# Patient Record
Sex: MALE | Race: WHITE | NOT HISPANIC OR LATINO | Employment: OTHER | URBAN - METROPOLITAN AREA
[De-identification: names, ages, dates, MRNs, and addresses within clinical notes are randomized per-mention and may not be internally consistent; named-entity substitution may affect disease eponyms.]

---

## 2017-01-10 ENCOUNTER — OFFICE VISIT (OUTPATIENT)
Dept: SPINE | Facility: CLINIC | Age: 67
End: 2017-01-10
Attending: NEUROLOGICAL SURGERY
Payer: COMMERCIAL

## 2017-01-10 VITALS
WEIGHT: 210 LBS | BODY MASS INDEX: 32.96 KG/M2 | SYSTOLIC BLOOD PRESSURE: 160 MMHG | HEIGHT: 67 IN | RESPIRATION RATE: 18 BRPM | DIASTOLIC BLOOD PRESSURE: 86 MMHG

## 2017-01-10 DIAGNOSIS — M48.02 DEGENERATIVE CERVICAL SPINAL STENOSIS: Primary | ICD-10-CM

## 2017-01-10 DIAGNOSIS — R29.898 WEAKNESS OF LEFT HAND: ICD-10-CM

## 2017-01-10 PROCEDURE — 99999 PR PBB SHADOW E&M-EST. PATIENT-LVL II: CPT | Mod: PBBFAC,,, | Performed by: NEUROLOGICAL SURGERY

## 2017-01-10 PROCEDURE — 99214 OFFICE O/P EST MOD 30 MIN: CPT | Mod: S$GLB,,, | Performed by: NEUROLOGICAL SURGERY

## 2017-01-10 PROCEDURE — 99212 OFFICE O/P EST SF 10 MIN: CPT | Mod: PBBFAC | Performed by: NEUROLOGICAL SURGERY

## 2017-01-10 NOTE — LETTER
January 10, 2017      Aydin Rojo MD  [address] [facility] [address] [place] [address]           [facility] - Spine Services  [address] [address]  Phone: [phone]  Fax: [phone]          Patient: Vikas Angelo   MR Number: [id number]   YOB: 1950   Date of Visit: 1/10/2017       Dear Dr. Adyin Rojo:    Thank you for referring Vikas Angelo to me for evaluation. Attached you will find relevant portions of my assessment and plan of care.    If you have questions, please do not hesitate to call me. I look forward to following Vikas Angelo along with you.    Sincerely,    Tray Qiu MD    Enclosure  CC:  No Recipients    If you would like to receive this communication electronically, please contact [email or web address] or [phone] to request more information on [facility] access.    For providers and/or their staff who would like to refer a patient to [facility], please contact us through our one-stop-shop provider referral line, [facility], at [phone].    If you feel you have received this communication in error or would no longer like to receive these types of communications, please e-mail [email or web address]

## 2017-01-12 ENCOUNTER — TELEPHONE (OUTPATIENT)
Dept: NEUROSURGERY | Facility: CLINIC | Age: 67
End: 2017-01-12

## 2017-01-12 NOTE — TELEPHONE ENCOUNTER
Informed patient of rescheduled appointment to next Tuesday. States he will call back once he checks his calendar.

## 2017-01-17 ENCOUNTER — OFFICE VISIT (OUTPATIENT)
Dept: NEUROSURGERY | Facility: CLINIC | Age: 67
End: 2017-01-17
Payer: COMMERCIAL

## 2017-01-17 VITALS
HEIGHT: 67 IN | SYSTOLIC BLOOD PRESSURE: 147 MMHG | WEIGHT: 210.75 LBS | BODY MASS INDEX: 33.08 KG/M2 | HEART RATE: 82 BPM | DIASTOLIC BLOOD PRESSURE: 89 MMHG

## 2017-01-17 DIAGNOSIS — I10 ESSENTIAL HYPERTENSION: ICD-10-CM

## 2017-01-17 DIAGNOSIS — R29.898 WEAKNESS OF LEFT HAND: ICD-10-CM

## 2017-01-17 DIAGNOSIS — G62.9 PERIPHERAL NERVE DISEASE: ICD-10-CM

## 2017-01-17 DIAGNOSIS — E78.5 HYPERLIPIDEMIA, UNSPECIFIED HYPERLIPIDEMIA TYPE: ICD-10-CM

## 2017-01-17 DIAGNOSIS — M54.12 CERVICAL RADICULOPATHY: ICD-10-CM

## 2017-01-17 DIAGNOSIS — E34.9 TESTOSTERONE DEFICIENCY: ICD-10-CM

## 2017-01-17 DIAGNOSIS — M48.02 CERVICAL STENOSIS OF SPINAL CANAL: Primary | ICD-10-CM

## 2017-01-17 DIAGNOSIS — M48.02 DEGENERATIVE CERVICAL SPINAL STENOSIS: ICD-10-CM

## 2017-01-17 PROCEDURE — 99214 OFFICE O/P EST MOD 30 MIN: CPT | Mod: S$GLB,,, | Performed by: NEUROLOGICAL SURGERY

## 2017-01-17 RX ORDER — SILDENAFIL 50 MG/1
50 TABLET, FILM COATED ORAL
COMMUNITY
Start: 2016-12-05

## 2017-01-17 RX ORDER — PRIMIDONE 50 MG/1
50 TABLET ORAL DAILY
COMMUNITY
End: 2018-01-03

## 2017-01-17 RX ORDER — FOLIC ACID 0.8 MG
800 TABLET ORAL DAILY
COMMUNITY

## 2017-01-17 RX ORDER — HYDROCORTISONE 25 MG/G
1 CREAM TOPICAL
COMMUNITY
End: 2018-01-03

## 2017-01-17 NOTE — MR AVS SNAPSHOT
[place] - Neurosurgery  [address] [facility]  [facility] [address]  Phone: [phone]  Fax: [phone]                  Vikas Angelo   2017 9:30 AM   Office Visit    Description:  Male : 1950   Provider:  Aydin Rojo MD   Department:  [place] - Neurosurgery           Reason for Visit     Cervical Spine Pain (C-spine)           Diagnoses this Visit        Comments    Cervical stenosis of spinal canal    -  Primary            To Do List           Goals (5 Years of Data)     None      [facility]     [facility] On Call Nurse Care Line -  Assistance  Registered nurses in the [facility] provide clinical advisement, health education, appointment booking, and other advisory services.  Call for this free service at [phone].             Medications           Message regarding Medications     Verify the changes and/or additions to your medication regime listed below are the same as discussed with your clinician today.  If any of these changes or additions are incorrect, please notify your healthcare provider.             Verify that the below list of medications is an accurate representation of the medications you are currently taking.  If none reported, the list may be blank. If incorrect, please contact your healthcare provider. Carry this list with you in case of emergency.           Current Medications     acetaminophen (TYLENOL) 500 MG tablet Take 500 mg by mouth every 6 (six) hours as needed for Pain.    allopurinol (ZYLOPRIM) 300 MG tablet Take 300 mg by mouth once daily.    amlodipine (NORVASC) 5 MG tablet Take 5 mg by mouth 2 (two) times daily.    aspirin (ECOTRIN) 81 MG EC tablet Take 81 mg by mouth once daily.    cyanocobalamin (VITAMIN B-12) 1000 MCG tablet Take 100 mcg by mouth once daily.    donepezil (ARICEPT) 10 MG tablet Take 10 mg by mouth 2 (two) times daily.    ferrous sulfate 324 mg (65 mg iron) TbEC Take 325 mg by mouth once daily.     "fluticasone (FLONASE) 50 mcg/actuation nasal spray 1 spray by Each Nare route once daily.    folic acid (FOLVITE) 800 MCG Tab Take 800 mcg by mouth once daily.    gabapentin (NEURONTIN) 300 MG capsule Take 300 mg by mouth 4 (four) times daily.    ibuprofen (ADVIL,MOTRIN) 800 MG tablet TAKE 1 TABLET (800 MG TOTAL) BY MOUTH 2 (TWO) TIMES DAILY AS NEEDED FOR PAIN.    meloxicam (MOBIC) 15 MG tablet Take 15 mg by mouth once daily.    memantine (NAMENDA) 10 MG Tab Take 5 mg by mouth 2 (two) times daily.    MULTIVIT-IRON-MIN-FOLIC ACID 3,500-18-0.4 UNIT-MG-MG ORAL CHEW Take by mouth.    niacin, inositol niacinate, 500 mg Tab Take 1 tablet by mouth.    primidone (MYSOLINE) 50 MG Tab Take 50 mg by mouth once daily.    sildenafil (VIAGRA) 50 MG tablet Take 50 mg by mouth as needed.    vitamin E 400 UNIT capsule Take 400 Units by mouth once daily.    hydrocortisone 2.5 % cream Place 1 application rectally as needed.           Clinical Reference Information           Vital Signs - Last Recorded  Most recent update: 1/17/2017  9:09 AM by Marita Vogel LPN    BP Pulse Ht Wt BMI    (!) 147/89 (BP Location: Left arm, Patient Position: Sitting, BP Method: Automatic) 82 5' 7" (1.702 m) 95.6 kg (210 lb 12.2 oz) 33.01 kg/m2      Blood Pressure          Most Recent Value    BP  (!)  147/89      Allergies as of 1/17/2017     No Known Allergies      Immunizations Administered on Date of Encounter - 1/17/2017     None      Orders Placed During Today's Visit     Future Labs/Procedures Expected by Expires    CT Cervical Spine Without Contrast  1/17/2017 1/17/2018      [facility] Sign-Up     Activating your [facility] account is as easy as 1-2-3!     1) Visit [email or web address], select Sign Up Now, enter this activation code and your date of birth, then select Next.  [email or web address]  Expires: 3/2/2017  8:44 AM      2) Create a username and password to use when you visit [facility] in the future and select a security question in case you lose " your password and select Next.    3) Enter your e-mail address and click Sign Up!    Additional Information  If you have questions, please e-mail [email or web address] or call [phone] to talk to our [facility] staff. Remember, [facility] is NOT to be used for urgent needs. For medical emergencies, dial 911.

## 2017-01-17 NOTE — PROGRESS NOTES
Neurosurgery History and Physical    Patient ID: Vikas Angelo is a 66 y.o. male.    Chief Complaint   Patient presents with    Cervical Spine Pain (C-spine)     Pt's initial OV here was on 10/31/16 in which he reported the following: 3 mo hx of constant LUE weakness; reports mild, intermittent neck pain; Denies exacerbating or alleviated factors; Denies bladder or bowel dysfunction; Denies pain/numbness/weakness in Rt arm; Pt has not done PT; has not had inj; Denies known hx of trauma/injury; Pt does have a hx of a 360 lumbar spinal fusion in [place], [place] 3yrs ago; saw Dr Qiu 1/10/17; no change in symptoms since last OV here 11/21/16     Pleasant 67 y/o male with progressive distal LUE weakness and hand muscle atrophy. Since his last visit, he reports his symptoms have stabilized.      Review of Systems   HENT: Negative.    Eyes: Negative.    Respiratory: Negative.    Cardiovascular: Negative.    Gastrointestinal: Negative.    Endocrine: Negative.    Genitourinary: Negative.    Musculoskeletal: Negative.    Skin: Negative.    Allergic/Immunologic: Negative.    Neurological: Positive for tremors, weakness and numbness. Negative for facial asymmetry and headaches.   Hematological: Negative.    Psychiatric/Behavioral: Negative.        Past Medical History   Diagnosis Date    Gout     Hyperlipidemia     Hypertension      Social History     Social History    Marital status:      Spouse name: N/A    Number of children: N/A    Years of education: N/A     Occupational History    Not on file.     Social History Main Topics    Smoking status: Former Smoker    Smokeless tobacco: Never Used    Alcohol use Yes      Comment: Social    Drug use: Not on file    Sexual activity: Not on file     Other Topics Concern    Not on file     Social History Narrative     Family History   Problem Relation Age of Onset    Hypertension Mother     Hypertension Father     Cancer Sister      Review of patient's  "allergies indicates:  No Known Allergies    Current Outpatient Prescriptions:     acetaminophen (TYLENOL) 500 MG tablet, Take 500 mg by mouth every 6 (six) hours as needed for Pain., Disp: , Rfl:     allopurinol (ZYLOPRIM) 300 MG tablet, Take 300 mg by mouth once daily., Disp: , Rfl:     amlodipine (NORVASC) 5 MG tablet, Take 5 mg by mouth 2 (two) times daily., Disp: , Rfl:     aspirin (ECOTRIN) 81 MG EC tablet, Take 81 mg by mouth once daily., Disp: , Rfl:     cyanocobalamin (VITAMIN B-12) 1000 MCG tablet, Take 100 mcg by mouth once daily., Disp: , Rfl:     donepezil (ARICEPT) 10 MG tablet, Take 10 mg by mouth 2 (two) times daily., Disp: , Rfl:     ferrous sulfate 324 mg (65 mg iron) TbEC, Take 325 mg by mouth once daily., Disp: , Rfl:     fluticasone (FLONASE) 50 mcg/actuation nasal spray, 1 spray by Each Nare route once daily., Disp: , Rfl:     folic acid (FOLVITE) 800 MCG Tab, Take 800 mcg by mouth once daily., Disp: , Rfl:     gabapentin (NEURONTIN) 300 MG capsule, Take 300 mg by mouth 4 (four) times daily., Disp: , Rfl:     ibuprofen (ADVIL,MOTRIN) 800 MG tablet, TAKE 1 TABLET (800 MG TOTAL) BY MOUTH 2 (TWO) TIMES DAILY AS NEEDED FOR PAIN., Disp: , Rfl: 0    meloxicam (MOBIC) 15 MG tablet, Take 15 mg by mouth once daily., Disp: , Rfl:     memantine (NAMENDA) 10 MG Tab, Take 5 mg by mouth 2 (two) times daily., Disp: , Rfl:     MULTIVIT-IRON-MIN-FOLIC ACID 3,500-18-0.4 UNIT-MG-MG ORAL CHEW, Take by mouth., Disp: , Rfl:     niacin, inositol niacinate, 500 mg Tab, Take 1 tablet by mouth., Disp: , Rfl:     primidone (MYSOLINE) 50 MG Tab, Take 50 mg by mouth once daily., Disp: , Rfl:     sildenafil (VIAGRA) 50 MG tablet, Take 50 mg by mouth as needed., Disp: , Rfl:     vitamin E 400 UNIT capsule, Take 400 Units by mouth once daily., Disp: , Rfl:     hydrocortisone 2.5 % cream, Place 1 application rectally as needed., Disp: , Rfl:   Blood pressure (!) 147/89, pulse 82, height 5' 7" (1.702 m), " weight 95.6 kg (210 lb 12.2 oz).      Neurologic Exam     Mental Status   Oriented to person, place, and time.   Attention: normal. Concentration: normal.   Speech: speech is normal   Level of consciousness: alert  Knowledge: good.     Cranial Nerves     CN II   Visual acuity: normal    CN III, IV, VI   Pupils are equal, round, and reactive to light.  Extraocular motions are normal.     CN V   Facial sensation intact.     CN VII   Facial expression full, symmetric.     CN VIII   Hearing: intact    CN IX, X   Palate: symmetric    CN XI   CN XI normal.     CN XII   CN XII normal.     Motor Exam   Muscle bulk: decreased (in left hand intrinsics and thenar eminence)  Overall muscle tone: normal    Strength   Right neck flexion: 5/5  Left neck flexion: 5/5  Right neck extension: 5/5  Left neck extension: 5/5  Right deltoid: 5/5  Left deltoid: 5/5  Right biceps: 5/5  Left biceps: 5/5  Right triceps: 5/5  Left triceps: 5/5  Right wrist flexion: 5/5  Left wrist flexion: 4/5  Right wrist extension: 5/5  Left wrist extension: 4/5  Right interossei: 5/5  Left interossei: 3/5  Right abdominals: 5/5  Left abdominals: 5/5  Right iliopsoas: 5/5  Left iliopsoas: 5/5  Right quadriceps: 5/5  Left quadriceps: 5/5  Right hamstrin/5  Left hamstrin/5  Right glutei: 5/5  Left glutei: 5/5  Right anterior tibial: 5/5  Left anterior tibial: 5/5  Right posterior tibial: 5/5  Left posterior tibial: 5/5  Right peroneal: 5/5  Left peroneal: 5/5  Right gastroc: 5/5  Left gastroc: 5/5    Sensory Exam   Sensory deficit distribution on left: C8    Gait, Coordination, and Reflexes     Gait  Gait: normal    Coordination   Finger to nose coordination: normal    Reflexes   Right plantar: normal  Left plantar: normal  Right Camacho: absent  Left Camacho: absent      Physical Exam   Constitutional: He is oriented to person, place, and time. He appears well-developed and well-nourished.   HENT:   Head: Normocephalic and atraumatic.   Eyes: EOM are  normal. Pupils are equal, round, and reactive to light.   Neck: Normal range of motion.   Cardiovascular: Normal rate.    Pulmonary/Chest: Effort normal.   Abdominal: Soft.   Musculoskeletal: Normal range of motion.   Neurological: He is alert and oriented to person, place, and time. He has a normal Finger-Nose-Finger Test. Gait normal.   Skin: Skin is warm and dry.   Psychiatric: He has a normal mood and affect. His speech is normal and behavior is normal. Judgment and thought content normal.       Provider dictation:  Mr Angelo has seen Dr. Qiu and I have discussed the case with him. While his presentation is slightly atypical for cervical radiculopathy, a peripheral nerve etiology is less likely and we believe he will benefit from minimally invasive laminoforaminotomies at C6-7 and C7-T1. I have discussed the risks, benefits and alternatives to the proposed operation in detail. All questions were answered. Risks discussed include but are not limited to: bleeding, infection, spinal fluid leak, injury to the nerves or spinal cord, injury to the blood vessels, stroke, heart attack, blood clots, destabilization, no improvement or worsening of symptoms, paralysis, need for more surgery including fusion, death. He agrees and wishes to proceed. He will need medical clearance from his PCP and to stop ASA 7-10 days prior to surgery. I will obtain a CT of the cervical spine for operative planning to study the bony anatomy of his foraminal stenosis.       Visit Diagnosis:  Cervical stenosis of spinal canal  -     CT Cervical Spine Without Contrast; Future; Expected date: 1/17/17    Hyperlipidemia, unspecified hyperlipidemia type    Essential hypertension    Peripheral nerve disease    Testosterone deficiency    Degenerative cervical spinal stenosis    Weakness of left hand    Cervical radiculopathy

## 2017-02-13 ENCOUNTER — TELEPHONE (OUTPATIENT)
Dept: NEUROSURGERY | Facility: CLINIC | Age: 67
End: 2017-02-13

## 2017-02-13 NOTE — TELEPHONE ENCOUNTER
Pt LVM regarding his surgery on 3/8/17.he hasn't received a call to review his appts. I informed him that I am now working on surgeries for 3/6 and his is next in line. i will be in touch with him by this Wednesday to review his surgery. Pt is agreeable and understands the plan.

## 2017-02-14 DIAGNOSIS — M48.02 DEGENERATIVE CERVICAL SPINAL STENOSIS: Primary | ICD-10-CM

## 2017-02-15 DIAGNOSIS — M48.02 CERVICAL STENOSIS OF SPINAL CANAL: ICD-10-CM

## 2017-02-15 DIAGNOSIS — Z01.818 PRE-OP EVALUATION: Primary | ICD-10-CM

## 2017-03-08 PROBLEM — M48.02 CERVICAL STENOSIS OF SPINE: Status: ACTIVE | Noted: 2017-03-08

## 2017-03-20 ENCOUNTER — CLINICAL SUPPORT (OUTPATIENT)
Dept: NEUROSURGERY | Facility: CLINIC | Age: 67
End: 2017-03-20
Payer: MEDICARE

## 2017-03-20 NOTE — PROGRESS NOTES
Pt is 12 days s/p MIS cervical laminoforaminotomies. No sutures or staples to be removed. No s/s of infection. Incision cleaned with chloraprep and steri strips removed. Pt reports post op pain strictly in neck and shoulders.  strength is reported to be improved since surgery.

## 2017-03-20 NOTE — MR AVS SNAPSHOT
[facility]  [address] [facility]  [place] [address]  Phone: [phone]  Fax: [phone]                  Vikas Angelo   3/20/2017 11:00 AM   Clinical Support    Description:  Male : 1950   Provider:  [facility]   Department:  [facility]                To Do List           Future Appointments        Provider Department Dept Phone    2017 11:00 AM Aydin Rojo MD [facility] [phone]      Goals (5 Years of Data)     None      [facility]     [facility] On Call Nurse Care Line -  Assistance  Registered nurses in the [facility] On Call Center provide clinical advisement, health education, appointment booking, and other advisory services.  Call for this free service at [phone].             Medications           Message regarding Medications     Verify the changes and/or additions to your medication regime listed below are the same as discussed with your clinician today.  If any of these changes or additions are incorrect, please notify your healthcare provider.             Verify that the below list of medications is an accurate representation of the medications you are currently taking.  If none reported, the list may be blank. If incorrect, please contact your healthcare provider. Carry this list with you in case of emergency.           Current Medications     acetaminophen (TYLENOL) 500 MG tablet Take 500 mg by mouth every 6 (six) hours as needed for Pain.    allopurinol (ZYLOPRIM) 300 MG tablet Take 300 mg by mouth once daily.    amlodipine (NORVASC) 5 MG tablet Take 5 mg by mouth 2 (two) times daily.    chlorhexidine (PERIDEX) 0.12 % solution Use as directed 15 mLs in the mouth or throat 2 (two) times daily.    cyanocobalamin (VITAMIN B-12) 1000 MCG tablet Take 100 mcg by mouth once daily.    donepezil (ARICEPT) 10 MG tablet Take 10 mg by mouth 2 (two) times daily.    ferrous sulfate 324 mg (65 mg iron) TbEC Take  325 mg by mouth once daily.    fluticasone (FLONASE) 50 mcg/actuation nasal spray 1 spray by Each Nare route as needed.     folic acid (FOLVITE) 800 MCG Tab Take 800 mcg by mouth once daily.    gabapentin (NEURONTIN) 300 MG capsule Take 300 mg by mouth 4 (four) times daily.    hydrocortisone 2.5 % cream Place 1 application rectally as needed.    memantine (NAMENDA) 10 MG Tab Take 5 mg by mouth 2 (two) times daily.    MULTIVIT-IRON-MIN-FOLIC ACID 3,500-18-0.4 UNIT-MG-MG ORAL CHEW Take 1 tablet by mouth every morning.     niacin, inositol niacinate, 500 mg Tab Take 1 tablet by mouth.    oxycodone-acetaminophen (PERCOCET) 5-325 mg per tablet Take 1 tablet by mouth every 6 (six) hours as needed for Pain.    primidone (MYSOLINE) 50 MG Tab Take 50 mg by mouth once daily.    sildenafil (VIAGRA) 50 MG tablet Take 50 mg by mouth as needed.    vitamin E 400 UNIT capsule Take 400 Units by mouth once daily.           Clinical Reference Information           Allergies as of 3/20/2017     No Known Allergies      Immunizations Administered on Date of Encounter - 3/20/2017     None      [facility] Sign-Up     Activating your [facility] account is as easy as 1-2-3!     1) Visit [email or web address], select Sign Up Now, enter this activation code and your date of birth, then select Next.  [email or web address]  Expires: 4/23/2017  9:19 AM      2) Create a username and password to use when you visit [facility] in the future and select a security question in case you lose your password and select Next.    3) Enter your e-mail address and click Sign Up!    Additional Information  If you have questions, please e-mail [email or web address] or call [phone] to talk to our [facility] staff. Remember, [facility] is NOT to be used for urgent needs. For medical emergencies, dial 911.         Language Assistance Services     ATTENTION: Language assistance services are available, free of charge. Please call [phone].      ATENCIÓN: Hernandez johnson  tiene a handy disposición servicios gratuitos de asistencia lingüística. Llsteven al [phone].     NHUNG Ý: N?u b?n nói Ti?ng Vi?t, có các d?ch v? h? tr? ngôn ng? mi?n phí dành cho b?n. G?i s? [phone].         [facility] complies with applicable Federal civil rights laws and does not discriminate on the basis of race, color, national origin, age, disability, or sex.

## 2017-04-04 ENCOUNTER — OFFICE VISIT (OUTPATIENT)
Dept: NEUROSURGERY | Facility: CLINIC | Age: 67
End: 2017-04-04
Payer: MEDICARE

## 2017-04-04 VITALS
HEART RATE: 84 BPM | DIASTOLIC BLOOD PRESSURE: 69 MMHG | BODY MASS INDEX: 33.84 KG/M2 | SYSTOLIC BLOOD PRESSURE: 153 MMHG | HEIGHT: 67 IN | WEIGHT: 215.63 LBS

## 2017-04-04 DIAGNOSIS — M54.12 CERVICAL RADICULOPATHY: ICD-10-CM

## 2017-04-04 DIAGNOSIS — R29.898 WEAKNESS OF LEFT HAND: Primary | ICD-10-CM

## 2017-04-04 DIAGNOSIS — M48.02 CERVICAL STENOSIS OF SPINE: ICD-10-CM

## 2017-04-04 PROCEDURE — 99024 POSTOP FOLLOW-UP VISIT: CPT | Mod: S$GLB,,, | Performed by: NEUROLOGICAL SURGERY

## 2017-04-04 RX ORDER — MELOXICAM 15 MG/1
15 TABLET ORAL DAILY PRN
COMMUNITY
End: 2019-05-10

## 2017-04-04 RX ORDER — IBUPROFEN 200 MG
200 TABLET ORAL EVERY 6 HOURS PRN
COMMUNITY
End: 2018-01-03 | Stop reason: SDUPTHER

## 2017-04-04 RX ORDER — ASPIRIN 81 MG/1
81 TABLET ORAL DAILY
COMMUNITY
End: 2019-05-10

## 2017-04-04 NOTE — MR AVS SNAPSHOT
[place] - Neurosurgery  [address]  [facility] [address]  Phone: [phone]  Fax: [phone]                  Vikas Angelo   2017 11:00 AM   Office Visit    Description:  Male : 1950   Provider:  Aydin Rojo MD   Department:  [place] - Neurosurgery           Reason for Visit     Post-op Evaluation                To Do List           Future Appointments        Provider Department Dept Phone    2017 10:30 AM Aydin Rojo MD [facility] Neurosurgery [phone]      Goals (5 Years of Data)     None      [facility] On Call     [facility] On Call Nurse Care Line -  Assistance  Unless otherwise directed by your provider, please contact [facility], our nurse care line that is available for  assistance.     Registered nurses in the [facility] Center provide: appointment scheduling, clinical advisement, health education, and other advisory services.  Call: [phone] (toll free)               Medications           Message regarding Medications     Verify the changes and/or additions to your medication regime listed below are the same as discussed with your clinician today.  If any of these changes or additions are incorrect, please notify your healthcare provider.             Verify that the below list of medications is an accurate representation of the medications you are currently taking.  If none reported, the list may be blank. If incorrect, please contact your healthcare provider. Carry this list with you in case of emergency.           Current Medications     acetaminophen (TYLENOL) 500 MG tablet Take 500 mg by mouth every 6 (six) hours as needed for Pain.    allopurinol (ZYLOPRIM) 300 MG tablet Take 300 mg by mouth once daily.    amlodipine (NORVASC) 5 MG tablet Take 5 mg by mouth 2 (two) times daily.    aspirin (ECOTRIN) 81 MG EC tablet Take 81 mg by mouth once daily.    cyanocobalamin (VITAMIN B-12) 1000 MCG tablet Take 100 mcg by mouth once  "daily.    donepezil (ARICEPT) 10 MG tablet Take 10 mg by mouth 2 (two) times daily.    ferrous sulfate 324 mg (65 mg iron) TbEC Take 325 mg by mouth once daily.    fluticasone (FLONASE) 50 mcg/actuation nasal spray 1 spray by Each Nare route as needed.     folic acid (FOLVITE) 800 MCG Tab Take 800 mcg by mouth once daily.    gabapentin (NEURONTIN) 300 MG capsule Take 300 mg by mouth 4 (four) times daily.    hydrocortisone 2.5 % cream Place 1 application rectally as needed.    ibuprofen (ADVIL,MOTRIN) 200 MG tablet Take 200 mg by mouth every 6 (six) hours as needed for Pain.    meloxicam (MOBIC) 15 MG tablet Take 15 mg by mouth daily as needed for Pain.    memantine (NAMENDA) 10 MG Tab Take 5 mg by mouth 2 (two) times daily.    MULTIVIT-IRON-MIN-FOLIC ACID 3,500-18-0.4 UNIT-MG-MG ORAL CHEW Take 1 tablet by mouth every morning.     niacin, inositol niacinate, 500 mg Tab Take 1 tablet by mouth.    vitamin E 400 UNIT capsule Take 400 Units by mouth once daily.    chlorhexidine (PERIDEX) 0.12 % solution Use as directed 15 mLs in the mouth or throat 2 (two) times daily.    oxycodone-acetaminophen (PERCOCET) 5-325 mg per tablet Take 1 tablet by mouth every 6 (six) hours as needed for Pain.    primidone (MYSOLINE) 50 MG Tab Take 50 mg by mouth once daily.    sildenafil (VIAGRA) 50 MG tablet Take 50 mg by mouth as needed.           Clinical Reference Information           Your Vitals Were     BP Pulse Height Weight BMI    153/69 84 5' 7" (1.702 m) 97.8 kg (215 lb 9.8 oz) 33.77 kg/m2      Blood Pressure          Most Recent Value    BP  (!)  153/69      Allergies as of 4/4/2017     No Known Allergies      Immunizations Administered on Date of Encounter - 4/4/2017     None      [facility] Sign-Up     Activating your [facility] account is as easy as 1-2-3!     1) Visit [email or web address], select Sign Up Now, enter this activation code and your date of birth, then select Next.  [email or web address]  Expires: 4/23/2017  9:19 AM      2) " Create a username and password to use when you visit [facility] in the future and select a security question in case you lose your password and select Next.    3) Enter your e-mail address and click Sign Up!    Additional Information  If you have questions, please e-mail [email or web address] or call [phone] to talk to our [facility] staff. Remember, [facility] is NOT to be used for urgent needs. For medical emergencies, dial 911.         Language Assistance Services     ATTENTION: Language assistance services are available, free of charge. Please call [phone].      ATENCIÓN: Si habla español, tiene a handy disposición servicios gratuitos de asistencia lingüística. Llame al [phone].     CHÚ Ý: N?u b?n nói Ti?ng Vi?t, có các d?ch v? h? tr? ngôn ng? mi?n phí dành cho b?n. G?i s? [phone].         [facility] complies with applicable Federal civil rights laws and does not discriminate on the basis of race, color, national origin, age, disability, or sex.

## 2017-04-04 NOTE — PROGRESS NOTES
Neurosurgery History and Physical    Patient ID: Vikas Angelo is a 66 y.o. male.    Chief Complaint   Patient presents with    Post-op Evaluation     4 wks s/p C6-7, C7-T1 MIS laminoforaminotomies. Pt states no improvement in pain or weakness to hand.          Review of Systems   HENT: Negative.    Eyes: Negative.    Respiratory: Negative.    Cardiovascular: Negative.    Gastrointestinal: Negative.    Endocrine: Negative.    Genitourinary: Negative.    Musculoskeletal: Negative.    Skin: Negative.    Allergic/Immunologic: Negative.    Neurological: Positive for tremors, weakness and numbness. Negative for facial asymmetry and headaches.   Hematological: Negative.    Psychiatric/Behavioral: Negative.        Past Medical History:   Diagnosis Date    Gout     Hyperlipidemia     Hypertension     Memory problem     Nerve pain     extremities     Social History     Social History    Marital status:      Spouse name: N/A    Number of children: N/A    Years of education: N/A     Occupational History    Not on file.     Social History Main Topics    Smoking status: Former Smoker     Types: Cigarettes, Pipe     Quit date: 2002    Smokeless tobacco: Never Used    Alcohol use Yes      Comment: 2 drinks daily    Drug use: No    Sexual activity: Not on file     Other Topics Concern    Not on file     Social History Narrative     Family History   Problem Relation Age of Onset    Hypertension Mother     Hypertension Father     Cancer Sister      Review of patient's allergies indicates:  No Known Allergies    Current Outpatient Prescriptions:     acetaminophen (TYLENOL) 500 MG tablet, Take 500 mg by mouth every 6 (six) hours as needed for Pain., Disp: , Rfl:     allopurinol (ZYLOPRIM) 300 MG tablet, Take 300 mg by mouth once daily., Disp: , Rfl:     amlodipine (NORVASC) 5 MG tablet, Take 5 mg by mouth 2 (two) times daily., Disp: , Rfl:     aspirin (ECOTRIN) 81 MG EC tablet, Take 81 mg by mouth once  "daily., Disp: , Rfl:     cyanocobalamin (VITAMIN B-12) 1000 MCG tablet, Take 100 mcg by mouth once daily., Disp: , Rfl:     donepezil (ARICEPT) 10 MG tablet, Take 10 mg by mouth 2 (two) times daily., Disp: , Rfl:     ferrous sulfate 324 mg (65 mg iron) TbEC, Take 325 mg by mouth once daily., Disp: , Rfl:     fluticasone (FLONASE) 50 mcg/actuation nasal spray, 1 spray by Each Nare route as needed. , Disp: , Rfl:     folic acid (FOLVITE) 800 MCG Tab, Take 800 mcg by mouth once daily., Disp: , Rfl:     gabapentin (NEURONTIN) 300 MG capsule, Take 300 mg by mouth 4 (four) times daily., Disp: , Rfl:     hydrocortisone 2.5 % cream, Place 1 application rectally as needed., Disp: , Rfl:     ibuprofen (ADVIL,MOTRIN) 200 MG tablet, Take 200 mg by mouth every 6 (six) hours as needed for Pain., Disp: , Rfl:     meloxicam (MOBIC) 15 MG tablet, Take 15 mg by mouth daily as needed for Pain., Disp: , Rfl:     memantine (NAMENDA) 10 MG Tab, Take 5 mg by mouth 2 (two) times daily., Disp: , Rfl:     MULTIVIT-IRON-MIN-FOLIC ACID 3,500-18-0.4 UNIT-MG-MG ORAL CHEW, Take 1 tablet by mouth every morning. , Disp: , Rfl:     niacin, inositol niacinate, 500 mg Tab, Take 1 tablet by mouth., Disp: , Rfl:     vitamin E 400 UNIT capsule, Take 400 Units by mouth once daily., Disp: , Rfl:     chlorhexidine (PERIDEX) 0.12 % solution, Use as directed 15 mLs in the mouth or throat 2 (two) times daily., Disp: , Rfl:     oxycodone-acetaminophen (PERCOCET) 5-325 mg per tablet, Take 1 tablet by mouth every 6 (six) hours as needed for Pain., Disp: 45 tablet, Rfl: 0    primidone (MYSOLINE) 50 MG Tab, Take 50 mg by mouth once daily., Disp: , Rfl:     sildenafil (VIAGRA) 50 MG tablet, Take 50 mg by mouth as needed., Disp: , Rfl:   Blood pressure (!) 153/69, pulse 84, height 5' 7" (1.702 m), weight 97.8 kg (215 lb 9.8 oz).      Neurologic Exam     Mental Status   Oriented to person, place, and time.   Attention: normal. Concentration: normal. "   Speech: speech is normal   Level of consciousness: alert  Knowledge: good.     Cranial Nerves     CN II   Visual acuity: normal    CN III, IV, VI   Pupils are equal, round, and reactive to light.  Extraocular motions are normal.     CN V   Facial sensation intact.     CN VII   Facial expression full, symmetric.     CN VIII   Hearing: intact    CN IX, X   Palate: symmetric    CN XI   CN XI normal.     CN XII   CN XII normal.     Motor Exam   Muscle bulk: decreased (in left hand intrinsics and thenar eminence)  Overall muscle tone: normal    Strength   Right neck flexion: 5/5  Left neck flexion: 5/5  Right neck extension: 5/5  Left neck extension: 5/5  Right deltoid: 5/5  Left deltoid: 5/5  Right biceps: 5/5  Left biceps: 5/5  Right triceps: 5/5  Left triceps: 5/5  Right wrist flexion: 5/5  Left wrist flexion: 4/5  Right wrist extension: 5/5  Left wrist extension: 4/5  Right interossei: 5/5  Left interossei: 3/5  Right abdominals: 5/5  Left abdominals: 5/5  Right iliopsoas: 5/5  Left iliopsoas: 5/5  Right quadriceps: 5/5  Left quadriceps: 5/5  Right hamstrin/5  Left hamstrin/5  Right glutei: 5/5  Left glutei: 5/5  Right anterior tibial: 5/5  Left anterior tibial: 5/5  Right posterior tibial: 5/5  Left posterior tibial: 5/5  Right peroneal: 5/5  Left peroneal: 5/5  Right gastroc: 5/5  Left gastroc: 5/5    Sensory Exam   Sensory deficit distribution on left: C8    Gait, Coordination, and Reflexes     Gait  Gait: normal    Coordination   Finger to nose coordination: normal    Reflexes   Right plantar: normal  Left plantar: normal  Right Camacho: absent  Left Camacho: absent      Physical Exam   Constitutional: He is oriented to person, place, and time. He appears well-developed and well-nourished.   HENT:   Head: Normocephalic and atraumatic.   Eyes: EOM are normal. Pupils are equal, round, and reactive to light.   Neck: Normal range of motion.   Cardiovascular: Normal rate.    Pulmonary/Chest: Effort normal.    Abdominal: Soft.   Musculoskeletal: Normal range of motion.   Neurological: He is alert and oriented to person, place, and time. He has a normal Finger-Nose-Finger Test. Gait normal.   Skin: Skin is warm and dry.   Psychiatric: He has a normal mood and affect. His speech is normal and behavior is normal. Judgment and thought content normal.       Provider dictation:  Definite improvement in left intrinsics, wrist flexion, extension and biceps strength. While he recognizes the strength improvement, Mr Angelo is concerned that he still has a hard time functionally with his left hand. I have explained to him that nerve recovery takes a long time and that his initial recovery is very encouraging. He knows that his hand strength will likely never be normal because of the degree of muscle atrophy that he initially presented with. I have encouraged him to perform hand strengthening exercises. He noted a new band like dull ache extending from his left shoulder to his hand. I suspect this may be related to sensory recovery of function and should resolve. No neck pain. F/U in 2 months.      Visit Diagnosis:  Weakness of left hand    Cervical radiculopathy    Cervical stenosis of spine

## 2017-06-05 ENCOUNTER — OFFICE VISIT (OUTPATIENT)
Dept: NEUROSURGERY | Facility: CLINIC | Age: 67
End: 2017-06-05
Payer: MEDICARE

## 2017-06-05 VITALS
BODY MASS INDEX: 33.93 KG/M2 | SYSTOLIC BLOOD PRESSURE: 144 MMHG | HEART RATE: 88 BPM | HEIGHT: 67 IN | WEIGHT: 216.19 LBS | DIASTOLIC BLOOD PRESSURE: 80 MMHG

## 2017-06-05 DIAGNOSIS — M54.12 CERVICAL RADICULOPATHY: ICD-10-CM

## 2017-06-05 DIAGNOSIS — M48.02 DEGENERATIVE CERVICAL SPINAL STENOSIS: ICD-10-CM

## 2017-06-05 DIAGNOSIS — M48.02 CERVICAL STENOSIS OF SPINE: Primary | ICD-10-CM

## 2017-06-05 DIAGNOSIS — G25.0 ESSENTIAL TREMOR: ICD-10-CM

## 2017-06-05 DIAGNOSIS — R29.898 WEAKNESS OF LEFT HAND: ICD-10-CM

## 2017-06-05 PROCEDURE — 99024 POSTOP FOLLOW-UP VISIT: CPT | Mod: S$GLB,,, | Performed by: NEUROLOGICAL SURGERY

## 2017-06-05 NOTE — PROGRESS NOTES
Neurosurgery History and Physical    Patient ID: Vikas Angelo is a 66 y.o. male.    Chief Complaint   Patient presents with    Follow-up     12 wks s/p C6-C7, C7-T1 MIS laminoforaminotomies. Pt states decreased function in Left hand with tingling and numbness. Pt states no improvement since surgery.          Review of Systems   HENT: Negative.    Eyes: Negative.    Respiratory: Negative.    Cardiovascular: Negative.    Gastrointestinal: Negative.    Endocrine: Negative.    Genitourinary: Negative.    Musculoskeletal: Negative.    Skin: Negative.    Allergic/Immunologic: Negative.    Neurological: Positive for tremors, weakness and numbness. Negative for facial asymmetry and headaches.   Hematological: Negative.    Psychiatric/Behavioral: Negative.        Past Medical History:   Diagnosis Date    Gout     Hyperlipidemia     Hypertension     Memory problem     Nerve pain     extremities     Social History     Social History    Marital status:      Spouse name: N/A    Number of children: N/A    Years of education: N/A     Occupational History    Not on file.     Social History Main Topics    Smoking status: Former Smoker     Types: Cigarettes, Pipe     Quit date: 2002    Smokeless tobacco: Never Used    Alcohol use Yes      Comment: 2 drinks daily    Drug use: No    Sexual activity: Not on file     Other Topics Concern    Not on file     Social History Narrative    No narrative on file     Family History   Problem Relation Age of Onset    Hypertension Mother     Hypertension Father     Cancer Sister      Review of patient's allergies indicates:  No Known Allergies    Current Outpatient Prescriptions:     acetaminophen (TYLENOL) 500 MG tablet, Take 500 mg by mouth every 6 (six) hours as needed for Pain., Disp: , Rfl:     allopurinol (ZYLOPRIM) 300 MG tablet, Take 300 mg by mouth once daily., Disp: , Rfl:     amlodipine (NORVASC) 5 MG tablet, Take 5 mg by mouth 2 (two) times daily., Disp:  ", Rfl:     aspirin (ECOTRIN) 81 MG EC tablet, Take 81 mg by mouth once daily., Disp: , Rfl:     chlorhexidine (PERIDEX) 0.12 % solution, Use as directed 15 mLs in the mouth or throat 2 (two) times daily., Disp: , Rfl:     cyanocobalamin (VITAMIN B-12) 1000 MCG tablet, Take 100 mcg by mouth once daily., Disp: , Rfl:     donepezil (ARICEPT) 10 MG tablet, Take 10 mg by mouth 2 (two) times daily., Disp: , Rfl:     ferrous sulfate 324 mg (65 mg iron) TbEC, Take 325 mg by mouth once daily., Disp: , Rfl:     fluticasone (FLONASE) 50 mcg/actuation nasal spray, 1 spray by Each Nare route as needed. , Disp: , Rfl:     folic acid (FOLVITE) 800 MCG Tab, Take 800 mcg by mouth once daily., Disp: , Rfl:     gabapentin (NEURONTIN) 300 MG capsule, Take 300 mg by mouth 4 (four) times daily., Disp: , Rfl:     hydrocortisone 2.5 % cream, Place 1 application rectally as needed., Disp: , Rfl:     ibuprofen (ADVIL,MOTRIN) 200 MG tablet, Take 200 mg by mouth every 6 (six) hours as needed for Pain., Disp: , Rfl:     meloxicam (MOBIC) 15 MG tablet, Take 15 mg by mouth daily as needed for Pain., Disp: , Rfl:     memantine (NAMENDA) 10 MG Tab, Take 5 mg by mouth 2 (two) times daily., Disp: , Rfl:     MULTIVIT-IRON-MIN-FOLIC ACID 3,500-18-0.4 UNIT-MG-MG ORAL CHEW, Take 1 tablet by mouth every morning. , Disp: , Rfl:     niacin, inositol niacinate, 500 mg Tab, Take 1 tablet by mouth., Disp: , Rfl:     oxycodone-acetaminophen (PERCOCET) 5-325 mg per tablet, Take 1 tablet by mouth every 6 (six) hours as needed for Pain., Disp: 45 tablet, Rfl: 0    primidone (MYSOLINE) 50 MG Tab, Take 50 mg by mouth once daily., Disp: , Rfl:     sildenafil (VIAGRA) 50 MG tablet, Take 50 mg by mouth as needed., Disp: , Rfl:     vitamin E 400 UNIT capsule, Take 400 Units by mouth once daily., Disp: , Rfl:   Blood pressure (!) 144/80, pulse 88, height 5' 7" (1.702 m), weight 98.1 kg (216 lb 2.6 oz).      Neurologic Exam     Mental Status   Oriented " to person, place, and time.   Attention: normal. Concentration: normal.   Speech: speech is normal   Level of consciousness: alert  Knowledge: good.     Cranial Nerves     CN II   Visual acuity: normal    CN III, IV, VI   Pupils are equal, round, and reactive to light.  Extraocular motions are normal.     CN V   Facial sensation intact.     CN VII   Facial expression full, symmetric.     CN VIII   Hearing: intact    CN IX, X   Palate: symmetric    CN XI   CN XI normal.     CN XII   CN XII normal.     Motor Exam   Muscle bulk: decreased (in left hand intrinsics and thenar eminence)  Overall muscle tone: normal    Strength   Right neck flexion: 5/5  Left neck flexion: 5/5  Right neck extension: 5/5  Left neck extension: 5/5  Right deltoid: 5/5  Left deltoid: 5/5  Right biceps: 5/5  Left biceps: 5/5  Right triceps: 5/5  Left triceps: 5/5  Right wrist flexion: 5/5  Left wrist flexion: 5/5  Right wrist extension: 5/5  Left wrist extension: 5/5  Right interossei: 5/5  Left interossei: 3/5  Right abdominals: 5/5  Left abdominals: 5/5  Right iliopsoas: 5/5  Left iliopsoas: 5/5  Right quadriceps: 5/5  Left quadriceps: 5/5  Right hamstrin/5  Left hamstrin/5  Right glutei: 5/5  Left glutei: 5/5  Right anterior tibial: 5/5  Left anterior tibial: 5/5  Right posterior tibial: 5/5  Left posterior tibial: 5/5  Right peroneal: 5/5  Left peroneal: 5/5  Right gastroc: 5/5  Left gastroc: 5/5    Sensory Exam   Sensory deficit distribution on left: C8    Gait, Coordination, and Reflexes     Gait  Gait: normal    Coordination   Finger to nose coordination: normal    Reflexes   Right plantar: normal  Left plantar: normal  Right Camacho: absent  Left Camacho: absent      Physical Exam   Constitutional: He is oriented to person, place, and time. He appears well-developed and well-nourished.   HENT:   Head: Normocephalic and atraumatic.   Eyes: EOM are normal. Pupils are equal, round, and reactive to light.   Neck: Normal range of  motion.   Cardiovascular: Normal rate.    Pulmonary/Chest: Effort normal.   Abdominal: Soft.   Musculoskeletal: Normal range of motion.   Neurological: He is alert and oriented to person, place, and time. He has a normal Finger-Nose-Finger Test. Gait normal.   Skin: Skin is warm and dry.   Psychiatric: He has a normal mood and affect. His speech is normal and behavior is normal. Judgment and thought content normal.       Provider dictation:  He is now 3 months post op. Definite improvement in left intrinsics, wrist flexion, extension and biceps strength. His wrist flexion and extension are now full strength and his hand  is significantly stronger. This represents ongoing improvement since his last visit 2 months ago. While he recognizes the strength improvement, Mr Angelo feels like he has more difficulty grasping things with his index and thumb since the last visit. Opponens pollicis and flexor pollicis are 4- to 4/5.  I have explained to him that nerve recovery takes a long time and that his initial recovery is very encouraging. He knows that his hand strength will likely never be normal because of the degree of muscle atrophy that he initially presented with. I have encouraged him to perform hand strengthening exercises. He continues to have a band like dull ache extending from his left shoulder to his hand. I suspect this may be related to sensory recovery of function and should resolve. No neck pain. F/U in 3 months with C spine XR with flexion-extension.        Visit Diagnosis:  Cervical stenosis of spine  -     X-Ray Cervical Spine 5 View With Flex And Ext; Future; Expected date: 06/05/2017    Essential tremor    Degenerative cervical spinal stenosis    Weakness of left hand    Cervical radiculopathy

## 2017-10-02 ENCOUNTER — OFFICE VISIT (OUTPATIENT)
Dept: NEUROSURGERY | Facility: CLINIC | Age: 67
End: 2017-10-02
Payer: COMMERCIAL

## 2017-10-02 ENCOUNTER — HOSPITAL ENCOUNTER (OUTPATIENT)
Dept: RADIOLOGY | Facility: HOSPITAL | Age: 67
Discharge: HOME | End: 2017-10-02
Attending: NEUROLOGICAL SURGERY
Payer: COMMERCIAL

## 2017-10-02 VITALS
BODY MASS INDEX: 33.34 KG/M2 | DIASTOLIC BLOOD PRESSURE: 85 MMHG | SYSTOLIC BLOOD PRESSURE: 137 MMHG | RESPIRATION RATE: 17 BRPM | HEART RATE: 95 BPM | HEIGHT: 67 IN | WEIGHT: 212.44 LBS

## 2017-10-02 DIAGNOSIS — G25.0 ESSENTIAL TREMOR: Primary | ICD-10-CM

## 2017-10-02 DIAGNOSIS — I10 ESSENTIAL HYPERTENSION: ICD-10-CM

## 2017-10-02 DIAGNOSIS — M48.02 CERVICAL STENOSIS OF SPINAL CANAL: ICD-10-CM

## 2017-10-02 DIAGNOSIS — M48.02 CERVICAL STENOSIS OF SPINE: ICD-10-CM

## 2017-10-02 DIAGNOSIS — G62.9 PERIPHERAL NERVE DISEASE: ICD-10-CM

## 2017-10-02 DIAGNOSIS — R29.898 WEAKNESS OF LEFT HAND: ICD-10-CM

## 2017-10-02 DIAGNOSIS — M54.12 CERVICAL RADICULOPATHY: ICD-10-CM

## 2017-10-02 PROCEDURE — 72052 X-RAY EXAM NECK SPINE 6/>VWS: CPT | Mod: 26,,, | Performed by: RADIOLOGY

## 2017-10-02 PROCEDURE — 1125F AMNT PAIN NOTED PAIN PRSNT: CPT | Mod: S$GLB,,, | Performed by: NEUROLOGICAL SURGERY

## 2017-10-02 PROCEDURE — 3079F DIAST BP 80-89 MM HG: CPT | Mod: S$GLB,,, | Performed by: NEUROLOGICAL SURGERY

## 2017-10-02 PROCEDURE — 3075F SYST BP GE 130 - 139MM HG: CPT | Mod: S$GLB,,, | Performed by: NEUROLOGICAL SURGERY

## 2017-10-02 PROCEDURE — 72052 X-RAY EXAM NECK SPINE 6/>VWS: CPT | Mod: TC,PO

## 2017-10-02 PROCEDURE — 99214 OFFICE O/P EST MOD 30 MIN: CPT | Mod: S$GLB,,, | Performed by: NEUROLOGICAL SURGERY

## 2017-10-02 PROCEDURE — 1159F MED LIST DOCD IN RCRD: CPT | Mod: S$GLB,,, | Performed by: NEUROLOGICAL SURGERY

## 2017-10-02 RX ORDER — MEMANTINE HYDROCHLORIDE 10 MG/1
TABLET ORAL
COMMUNITY
Start: 2017-04-03 | End: 2017-10-02 | Stop reason: SDUPTHER

## 2017-10-02 RX ORDER — DONEPEZIL HYDROCHLORIDE 10 MG/1
10 TABLET, FILM COATED ORAL
COMMUNITY
Start: 2017-08-21

## 2017-10-02 RX ORDER — SILDENAFIL 100 MG/1
100 TABLET, FILM COATED ORAL
COMMUNITY
Start: 2017-08-21 | End: 2017-10-02 | Stop reason: SDUPTHER

## 2017-10-02 RX ORDER — AMLODIPINE BESYLATE 5 MG/1
TABLET ORAL
COMMUNITY
Start: 2017-08-10

## 2017-10-02 RX ORDER — GABAPENTIN 300 MG/1
CAPSULE ORAL
COMMUNITY
Start: 2017-06-23

## 2017-10-02 RX ORDER — IBUPROFEN 800 MG/1
TABLET ORAL
Refills: 3 | COMMUNITY
Start: 2017-09-20 | End: 2017-10-02

## 2017-10-02 NOTE — PROGRESS NOTES
Neurosurgery History and Physical    Patient ID: Vikas Angelo is a 67 y.o. male.    Chief Complaint   Patient presents with    Post-op Evaluation     6 month S/P MIS C6-7, C7-T1 lamino forancinetomy,   he states Pt states decreased function in Left hand with tingling and numbness. Pt states no improvement since surgery.          Review of Systems   HENT: Negative.    Eyes: Negative.    Respiratory: Negative.    Cardiovascular: Negative.    Gastrointestinal: Negative.    Endocrine: Negative.    Genitourinary: Negative.    Musculoskeletal: Negative.    Skin: Negative.    Allergic/Immunologic: Negative.    Neurological: Positive for tremors, weakness and numbness. Negative for facial asymmetry and headaches.   Hematological: Negative.    Psychiatric/Behavioral: Negative.        Past Medical History:   Diagnosis Date    Gout     Hyperlipidemia     Hypertension     Memory problem     Nerve pain     extremities     Social History     Social History    Marital status:      Spouse name: N/A    Number of children: N/A    Years of education: N/A     Occupational History    Not on file.     Social History Main Topics    Smoking status: Former Smoker     Types: Cigarettes, Pipe     Quit date: 2002    Smokeless tobacco: Never Used    Alcohol use Yes      Comment: 2 drinks daily    Drug use: No    Sexual activity: Not on file     Other Topics Concern    Not on file     Social History Narrative    No narrative on file     Family History   Problem Relation Age of Onset    Hypertension Mother     Hypertension Father     Cancer Sister      Review of patient's allergies indicates:  No Known Allergies    Current Outpatient Prescriptions:     acetaminophen (TYLENOL) 500 MG tablet, Take 500 mg by mouth every 6 (six) hours as needed for Pain., Disp: , Rfl:     allopurinol (ZYLOPRIM) 300 MG tablet, Take 300 mg by mouth once daily., Disp: , Rfl:     amlodipine (NORVASC) 5 MG tablet, TAKE 1 TABLET TWICE DAILY,  "Disp: , Rfl:     aspirin (ECOTRIN) 81 MG EC tablet, Take 81 mg by mouth once daily., Disp: , Rfl:     chlorhexidine (PERIDEX) 0.12 % solution, Use as directed 15 mLs in the mouth or throat 2 (two) times daily., Disp: , Rfl:     donepezil (ARICEPT) 10 MG tablet, Take 10 mg by mouth 2 (two) times daily., Disp: , Rfl:     donepezil (ARICEPT) 10 MG tablet, Take 10 mg by mouth., Disp: , Rfl:     ferrous sulfate 324 mg (65 mg iron) TbEC, Take 325 mg by mouth once daily., Disp: , Rfl:     fluticasone (FLONASE) 50 mcg/actuation nasal spray, 1 spray by Each Nare route as needed. , Disp: , Rfl:     gabapentin (NEURONTIN) 300 MG capsule, TAKE 2 CAPSULES (600 MG TOTAL) BY MOUTH 2 (TWO) TIMES DAILY., Disp: , Rfl:     hydrocortisone 2.5 % cream, Place 1 application rectally as needed., Disp: , Rfl:     ibuprofen (ADVIL,MOTRIN) 200 MG tablet, Take 200 mg by mouth every 6 (six) hours as needed for Pain., Disp: , Rfl:     meloxicam (MOBIC) 15 MG tablet, Take 15 mg by mouth daily as needed for Pain., Disp: , Rfl:     memantine (NAMENDA) 10 MG Tab, Take 5 mg by mouth 2 (two) times daily., Disp: , Rfl:     MULTIVIT-IRON-MIN-FOLIC ACID 3,500-18-0.4 UNIT-MG-MG ORAL CHEW, Take 1 tablet by mouth every morning. , Disp: , Rfl:     niacin, inositol niacinate, 500 mg Tab, Take 1 tablet by mouth., Disp: , Rfl:     oxycodone-acetaminophen (PERCOCET) 5-325 mg per tablet, Take 1 tablet by mouth every 6 (six) hours as needed for Pain., Disp: 45 tablet, Rfl: 0    primidone (MYSOLINE) 50 MG Tab, Take 50 mg by mouth once daily., Disp: , Rfl:     vitamin E 100 UNIT capsule, Take 400 Units by mouth., Disp: , Rfl:     vitamin E 400 UNIT capsule, Take 400 Units by mouth once daily., Disp: , Rfl:     folic acid (FOLVITE) 800 MCG Tab, Take 800 mcg by mouth once daily., Disp: , Rfl:     sildenafil (VIAGRA) 50 MG tablet, Take 50 mg by mouth as needed., Disp: , Rfl:   Blood pressure 137/85, pulse 95, resp. rate 17, height 5' 7" (1.702 m), " weight 96.3 kg (212 lb 6.6 oz).      Neurologic Exam     Mental Status   Oriented to person, place, and time.   Attention: normal. Concentration: normal.   Speech: speech is normal   Level of consciousness: alert  Knowledge: good.     Cranial Nerves     CN II   Visual acuity: normal    CN III, IV, VI   Pupils are equal, round, and reactive to light.  Extraocular motions are normal.     CN V   Facial sensation intact.     CN VII   Facial expression full, symmetric.     CN VIII   Hearing: intact    CN IX, X   Palate: symmetric    CN XI   CN XI normal.     CN XII   CN XII normal.     Motor Exam   Muscle bulk: decreased (in left hand intrinsics and thenar eminence)  Overall muscle tone: normal    Strength   Right neck flexion: 5/5  Left neck flexion: 5/5  Right neck extension: 5/5  Left neck extension: 5/5  Right deltoid: 5/5  Left deltoid: 5/5  Right biceps: 5/5  Left biceps: 5/5  Right triceps: 5/5  Left triceps: 5/5  Right wrist flexion: 5/5  Left wrist flexion: 5/5  Right wrist extension: 5/5  Left wrist extension: 5/5  Right interossei: 5/5  Left interossei: 3/5  Right abdominals: 5/5  Left abdominals: 5/5  Right iliopsoas: 5/5  Left iliopsoas: 5/5  Right quadriceps: 5/5  Left quadriceps: 5/5  Right hamstrin/5  Left hamstrin/5  Right glutei: 5/5  Left glutei: 5/5  Right anterior tibial: 5/5  Left anterior tibial: 5/5  Right posterior tibial: 5/5  Left posterior tibial: 5/5  Right peroneal: 5/5  Left peroneal: 5/5  Right gastroc: 5/5  Left gastroc: 5/5    Sensory Exam   Sensory deficit distribution on left: C8    Gait, Coordination, and Reflexes     Gait  Gait: normal    Coordination   Finger to nose coordination: normal    Reflexes   Right plantar: normal  Left plantar: normal  Right Camacho: absent  Left Camacho: absent      Physical Exam   Constitutional: He is oriented to person, place, and time. He appears well-developed and well-nourished.   HENT:   Head: Normocephalic and atraumatic.   Eyes: EOM are  normal. Pupils are equal, round, and reactive to light.   Neck: Normal range of motion.   Cardiovascular: Normal rate.    Pulmonary/Chest: Effort normal.   Abdominal: Soft.   Musculoskeletal: Normal range of motion.   Neurological: He is alert and oriented to person, place, and time. He has a normal Finger-Nose-Finger Test. Gait normal.   Skin: Skin is warm and dry.   Psychiatric: He has a normal mood and affect. His speech is normal and behavior is normal. Judgment and thought content normal.       Provider dictation:  He is now 6 months post op. While he has definite improvement in left intrinsics, wrist flexion, extension and biceps strength, he continue to have significant weakness in hand  and intrinsics. He knows that his hand strength will likely never be normal because of the degree of muscle atrophy that he initially presented with. I have encouraged him to continue performing hand strengthening exercises. He continues to have a band like dull ache extending from his left shoulder to his hand. No neck pain. Flexion-extension X rays show not instability. I would like to obtain a repeat EMG/NCT specifically to look for signs of recovery. I also want to rule out new superimpopsed ulnar neuropathy. His initial EMG/NCT did not show ulnar neuropathy. I will review the results and we will call him to discuss them. Provided there is evidence of recovery and no ulnar neuropathy, he should follow up in 6 months. I will also refer him to Neurology for ongoing management of his early-onset dementia and tremors. He was seen at [place] in the past but is asking to be seen locally.        Visit Diagnosis:  Essential tremor    Peripheral nerve disease    Weakness of left hand    Cervical radiculopathy    Cervical stenosis of spine    Essential hypertension

## 2017-10-03 ENCOUNTER — TELEPHONE (OUTPATIENT)
Dept: NEUROLOGY | Facility: CLINIC | Age: 67
End: 2017-10-03

## 2017-10-03 NOTE — TELEPHONE ENCOUNTER
----- Message from Swati Alonso sent at 10/3/2017 12:03 PM CDT -----  Contact: pt  Pt would like to reschedule appointment to after 12/20 so spouse can be present...[phone] (home)

## 2017-10-03 NOTE — TELEPHONE ENCOUNTER
Spoke with patient. Cancelled appointment in December. Informed him that I would call him to schedule appointment in January once that schedule was out. He stated he needed it before January 9th.

## 2017-10-11 ENCOUNTER — TELEPHONE (OUTPATIENT)
Dept: NEUROLOGY | Facility: CLINIC | Age: 67
End: 2017-10-11

## 2017-11-01 ENCOUNTER — OFFICE VISIT (OUTPATIENT)
Dept: PHYSICAL MEDICINE AND REHAB | Facility: CLINIC | Age: 67
End: 2017-11-01
Payer: COMMERCIAL

## 2017-11-01 DIAGNOSIS — M48.02 CERVICAL STENOSIS OF SPINAL CANAL: ICD-10-CM

## 2017-11-01 PROCEDURE — 95910 NRV CNDJ TEST 7-8 STUDIES: CPT | Mod: S$GLB,,, | Performed by: PHYSICAL MEDICINE & REHABILITATION

## 2017-11-01 PROCEDURE — 99499 UNLISTED E&M SERVICE: CPT | Mod: S$GLB,,, | Performed by: PHYSICAL MEDICINE & REHABILITATION

## 2017-11-01 PROCEDURE — 95886 MUSC TEST DONE W/N TEST COMP: CPT | Mod: S$GLB,,, | Performed by: PHYSICAL MEDICINE & REHABILITATION

## 2017-11-01 NOTE — PROGRESS NOTES
[facility]  Ana Cristina Peter D.O.  Physical Medicine and Rehab  Phone: [phone]  Fax: [phone]                Patient: Vikas Angelo   Patient ID: [id number]   Sex: Male   YOB: 1950   Age: 67 Years 3 Months   Notes:     Last visit date: 11/1/2017             Visit date and time: 11/1/2017 12:12   Patient Age on Visit Date: 67 Years 3 Months   Referring Physician:     Diagnoses:               Sensory NCS      Nerve / Sites Rec. Site Onset Lat Peak Lat Ref. NP Amp Ref. PP Amp Ref. Segments Distance Velocity     ms ms ms µV µV µV µV  cm m/s   R Median - Digit II (Antidromic)      Wrist Dig II 2.86 3.70 ?3.40 8.1 ?15.0 20.2 ?20.0 Wrist - Dig II 13 45   L Median - Digit II (Antidromic)      Wrist Dig II 2.55 3.44 ?3.40 16.2 ?15.0 16.3 ?20.0 Wrist - Dig II 13 51   R Ulnar - Digit V (Antidromic)      Wrist Dig V 2.34 3.02 ?3.10 13.1 ?10.0 26.6 ?15.0 Wrist - Dig V 11 47   L Ulnar - Digit V (Antidromic)      Wrist Dig V 2.19 2.92 ?3.10 13.9 ?10.0 18.6 ?15.0 Wrist - Dig V 11 50           Motor NCS      Nerve / Sites Muscle Latency Ref. Amplitude Ref. Amp % Duration Segments Distance Lat Diff Velocity Ref.     ms ms mV mV % ms  cm ms m/s m/s   L Median - APB      Wrist APB 3.28 ?4.40 4.0 ?4.0 100 5.00 Wrist - APB 7         Elbow APB 9.53  0.3  7.99 6.77 Elbow - Wrist 23.5 6.25 38 ?49   R Median - APB      Wrist APB 3.13 ?4.40 12.5 ?4.0 100 6.72 Wrist - APB 7         Elbow APB 8.80  0.4  3.44 5.94 Elbow - Wrist 22.5 5.68 40 ?49   L Ulnar - ADM      Wrist ADM 2.71 ?3.60 2.3 ?5.0 100 11.93 Wrist - ADM 7         B.Elbow ADM 6.56  2.3  99.3 8.13 B.Elbow - Wrist 19.5 3.85 51 ?49      A.Elbow ADM 9.17  2.0  88 7.76 A.Elbow - B.Elbow 10 2.60 38 ?49   R Ulnar - ADM      Wrist ADM 2.34 ?3.60 7.3 ?5.0 100 7.14 Wrist - ADM 7         B.Elbow ADM 6.09  6.9  94.2 8.33 B.Elbow - Wrist 21 3.75 56 ?49      A.Elbow ADM 8.70  7.0  96.4 7.97 A.Elbow - B.Elbow 10 2.60 38 ?49           EMG          EMG Summary  Table     Spontaneous MUAP Recruitment   Muscle IA Fib PSW Fasc H.F. Amp Dur. PPP Pattern   L. Triceps brachii N None None None None N N N N   R. Triceps brachii N None None None None N N N N   L. Deltoid N None None None None N 1+ 1+ Reduced   R. Deltoid N None None None None N N N N   L. Biceps brachii N None None None None N N N N   R. Biceps brachii N None None None None N N N N   L. Extensor carpi radialis longus N None None None None 1+ 1+ 1+ Reduced   R. Extensor carpi radialis longus N None None None None N N 1+ Reduced   L. Brachioradialis N None None None None 1+ 1+ 1+ Reduced   R. Brachioradialis N None None None None N N N N   L. First dorsal interosseous 1+ None 1+ None None 1+ 1+ 1+ Reduced   R. First dorsal interosseous N None None None None N N N N   L. Pronator teres N None None None None N N N N   R. Pronator teres N None None None None N N 1+ Reduced           Summary    The motor conduction test had results outside of the specified normal range in all 4 of the tested nerves:   In the L Median - APB study  o the take off velocity result was reduced for Elbow - Wrist segment   In the R Median - APB study  o the take off velocity result was reduced for Elbow - Wrist segment   In the L Ulnar - ADM study  o the peak amplitude result was reduced for Wrist stimulation  o the take off velocity result was reduced for A.Elbow - B.Elbow segment   In the R Ulnar - ADM study  o the take off velocity result was reduced for A.Elbow - B.Elbow segment    The sensory conduction test was performed on 4 nerve(s). The results were normal in 2 nerve(s): R Ulnar - Digit V (Antidromic), L Ulnar - Digit V (Antidromic). Results outside the specified normal range were found in 2 nerve(s), as follows:   In the R Median - Digit II (Antidromic) study  o the peak latency result was increased for Wrist stimulation  o the peak amplitude result was reduced for Wrist stimulation   In the L Median - Digit II (Antidromic)  study  o the peak latency result was increased for Wrist stimulation    The needle EMG examination was performed in 14 muscles. It was normal in 8 muscle(s): L. Triceps brachii, R. Triceps brachii, R. Deltoid, L. Biceps brachii, R. Biceps brachii, R. Brachioradialis, R. First dorsal interosseous, L. Pronator teres. The study was abnormal in 6 muscle(s), with the following distribution:   Abnormal spontaneous/insertional activity was found in L. First dorsal interosseous.   The MUP waveform abnormality was found in L. Deltoid, L. Extensor carpi radialis longus, R. Extensor carpi radialis longus, L. Brachioradialis, L. First dorsal interosseous, R. Pronator teres.   Abnormal interference pattern was found in L. Deltoid, L. Extensor carpi radialis longus, R. Extensor carpi radialis longus, L. Brachioradialis, L. First dorsal interosseous, R. Pronator teres.        NOTE- PT. IS S/P CERVICAL SPINE SURGERY SO NEEDLE EMG STUDY WAS NOT DONE IN CERVICAL PARASPINALS B/C OF THEIR LOW YIELD AFTER SURGERY.      Conclusion:   Moderate left C6 chronic radiculopathy w. NO active denervation  Mild right C6 chronic radiculopathy w. NO active denervation  Mild to moderate bilateral carpal tunnel syndrome  Mild right ulnar neuropathy at elbow  Moderate left ulnar neuropathy at elbow          ____________________________  Ana Cristina Peter D.O.

## 2017-11-03 ENCOUNTER — TELEPHONE (OUTPATIENT)
Dept: NEUROSURGERY | Facility: CLINIC | Age: 67
End: 2017-11-03

## 2017-11-03 NOTE — TELEPHONE ENCOUNTER
----- Message from Dayana Nelson sent at 11/2/2017 12:34 PM CDT -----  Contact: self   Patient called stated he had EMG done. Dr. Rojo' last note states he will be called with the results.

## 2017-11-07 NOTE — TELEPHONE ENCOUNTER
Called pt and discussed OT options. Faxed referral over to [facility] in [place]. Set a reminder to set up f/u appt for patient.

## 2018-01-03 ENCOUNTER — LAB VISIT (OUTPATIENT)
Dept: LAB | Facility: HOSPITAL | Age: 68
End: 2018-01-03
Attending: PSYCHIATRY & NEUROLOGY
Payer: COMMERCIAL

## 2018-01-03 ENCOUNTER — OFFICE VISIT (OUTPATIENT)
Dept: NEUROLOGY | Facility: CLINIC | Age: 68
End: 2018-01-03
Payer: COMMERCIAL

## 2018-01-03 VITALS
WEIGHT: 218.25 LBS | BODY MASS INDEX: 34.26 KG/M2 | HEIGHT: 67 IN | DIASTOLIC BLOOD PRESSURE: 78 MMHG | SYSTOLIC BLOOD PRESSURE: 149 MMHG | RESPIRATION RATE: 18 BRPM | HEART RATE: 75 BPM

## 2018-01-03 DIAGNOSIS — G31.84 MILD COGNITIVE IMPAIRMENT: ICD-10-CM

## 2018-01-03 DIAGNOSIS — G25.0 BENIGN ESSENTIAL TREMOR: ICD-10-CM

## 2018-01-03 DIAGNOSIS — G60.9 IDIOPATHIC PERIPHERAL NEUROPATHY: ICD-10-CM

## 2018-01-03 DIAGNOSIS — G25.0 BENIGN ESSENTIAL TREMOR: Primary | ICD-10-CM

## 2018-01-03 LAB
25(OH)D3+25(OH)D2 SERPL-MCNC: 32 NG/ML
VIT B12 SERPL-MCNC: >2000 PG/ML

## 2018-01-03 PROCEDURE — 82306 VITAMIN D 25 HYDROXY: CPT

## 2018-01-03 PROCEDURE — 99999 PR PBB SHADOW E&M-EST. PATIENT-LVL IV: CPT | Mod: PBBFAC,,, | Performed by: PSYCHIATRY & NEUROLOGY

## 2018-01-03 PROCEDURE — 84425 ASSAY OF VITAMIN B-1: CPT

## 2018-01-03 PROCEDURE — 36415 COLL VENOUS BLD VENIPUNCTURE: CPT | Mod: PO

## 2018-01-03 PROCEDURE — 82607 VITAMIN B-12: CPT

## 2018-01-03 PROCEDURE — 99205 OFFICE O/P NEW HI 60 MIN: CPT | Mod: S$GLB,,, | Performed by: PSYCHIATRY & NEUROLOGY

## 2018-01-03 RX ORDER — IBUPROFEN 800 MG/1
TABLET ORAL
COMMUNITY
Start: 2017-12-27

## 2018-01-03 RX ORDER — HYDROCORTISONE 25 MG/G
CREAM TOPICAL 2 TIMES DAILY
COMMUNITY

## 2018-01-03 NOTE — PROGRESS NOTES
Subjective:      1/15/2018       Patient ID: Vikas Angelo is a right handed 67 y.o.  male who presents for tremors and memory loss.     History of Present Illness    Reviewed records in Epic.  Patient has a history of cervical spinal fusion, recently had EMG/nerve conduction study of the left arm which showed ulnar neuropathy and chronic moderate left C6 radiculopathy. [place] [facility] [place] and pain.     Earlier in the year, neurosurgery obtain multiple studies and determined he may get some benefit from laminar foraminotomy at C6-7 and C7-T1, as he may have had some combination of cervical radiculopathy and peripheral nerve dysfunction in the upper extremities.  Had surgery in March 2017.     He is referred today for evaluation and management of early onset dementia and tremor.    Previously had seen Dr. Lai in [place], [place] and has been on memantine and donepezil.     Tremor - does not interfere particularly with daily activities, more of a nuisance.  Constant, equal in both hands.  Not aware of any exacerbating factors, does not drink caffeine.  Not aware of any family hx.  He is cutting back on alcohol - has quit a couple of times in the past.  Does not feel this has been problematic, but has been concerned about his intake previously.      He is here with his .  Feels he has always had good memory up until a few years ago, then started to fade. He is concerned about potential medications exacerbating his memory.  Also concerned about impact of long term drinking as contributing.  STM>LTM.    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   does most of the household accounting.   is a professional , has always done all the cooking.  No falls.      + repeating questions, can be as soon as 15 minutes later.  Forgets conversations, dates.  He is using a calendar to keep organized.  About a couple of years ago     Does  not exercise regularly.  Eats reasonably well, but not very often.  Sleeps often during the day, would tend to be awake at night, may be sleeping more than usual.      + peripheral neuropathy - at most 5-6 years, pins and needles, on GBP    Review of patient's allergies indicates:  No Known Allergies  Current Outpatient Prescriptions   Medication Sig Dispense Refill    allopurinol (ZYLOPRIM) 300 MG tablet Take 300 mg by mouth once daily.      amlodipine (NORVASC) 5 MG tablet TAKE 1 TABLET TWICE DAILY      aspirin (ECOTRIN) 81 MG EC tablet Take 81 mg by mouth once daily.      donepezil (ARICEPT) 10 MG tablet Take 10 mg by mouth.      ferrous sulfate 324 mg (65 mg iron) TbEC Take 325 mg by mouth once daily.      fluticasone (FLONASE) 50 mcg/actuation nasal spray 1 spray by Each Nare route as needed.       folic acid (FOLVITE) 800 MCG Tab Take 800 mcg by mouth once daily.      gabapentin (NEURONTIN) 300 MG capsule TAKE 2 CAPSULES (600 MG TOTAL) BY MOUTH 2 (TWO) TIMES DAILY.      hydrocortisone (ANUSOL-HC) 2.5 % rectal cream Place rectally 2 (two) times daily.      ibuprofen (ADVIL,MOTRIN) 800 MG tablet TAKE 1 TABLET (800 MG TOTAL) BY MOUTH 2 (TWO) TIMES DAILY AS NEEDED FOR PAIN.      meloxicam (MOBIC) 15 MG tablet Take 15 mg by mouth daily as needed for Pain.      memantine (NAMENDA) 10 MG Tab Take 5 mg by mouth 2 (two) times daily.      MULTIVIT-IRON-MIN-FOLIC ACID 3,500-18-0.4 UNIT-MG-MG ORAL CHEW Take 1 tablet by mouth every morning.       niacin, inositol niacinate, 500 mg Tab Take 1 tablet by mouth.      sildenafil (VIAGRA) 50 MG tablet Take 50 mg by mouth as needed.      vitamin E 400 UNIT capsule Take 400 Units by mouth once daily.       No current facility-administered medications for this visit.        Past Medical History  Past Medical History:   Diagnosis Date    Gout     Hyperlipidemia     Hypertension     Memory problem     Nerve pain     extremities       Past Surgical History  Past  Surgical History:   Procedure Laterality Date    C6-7, C7-T1 MIS laminoforaminotomies  03/08/2017    LUMBAR FUSION      with hardware in back       Family History  Family History   Problem Relation Age of Onset    Hypertension Mother     Hypertension Father     Cancer Sister        Social History  Social History     Social History    Marital status:      Spouse name: N/A    Number of children: N/A    Years of education: N/A     Occupational History    Not on file.     Social History Main Topics    Smoking status: Former Smoker     Types: Cigarettes, Pipe     Quit date: 2002    Smokeless tobacco: Never Used    Alcohol use Yes      Comment: 2 drinks daily    Drug use: No    Sexual activity: Not on file     Other Topics Concern    Not on file     Social History Narrative    No narrative on file        Review of Systems  Review of Systems    Objective:        Vitals:    01/03/18 1314   BP: (!) 149/78   Pulse: 75   Resp: 18     Body mass index is 34.18 kg/m².  Neurologic Exam     Mental Status   Oriented to person.   Oriented to place.   Oriented to time.   Registration: recalls 3 of 3 objects. Recall at 5 minutes: recalls 1 of 3 objects. Follows 3 step commands.   Attention: normal. Concentration: normal.   Speech: speech is normal   Tues, Esdras 3, 2018  3/3  [facility]  [id number]    Recall 1/3, 3/3 with prompting    Normal pentagons, normal clock drawing test       Cranial Nerves   Cranial nerves II through XII intact.     CN II   Visual fields full to confrontation.     CN III, IV, VI   Pupils are equal, round, and reactive to light.  Extraocular motions are normal.   CN III: no CN III palsy  CN VI: no CN VI palsy    CN V   Facial sensation intact.     CN VII   Facial expression full, symmetric.     CN VIII   CN VIII normal.     Motor Exam LUE  weakness, atrophy intrinsic hand mm.  Otherwise 5/5     Sensory Exam   Right arm light touch: normal  Left arm light touch: normal  Right  arm vibration: normal  Left arm vibration: normal  Right leg vibration: decreased from toes  Left leg vibration: normal  Right arm proprioception: normal  Right leg pinprick: decreased from ankle  Left leg pinprick: decreased from ankle  Normal temp but diffuse at the feet, diminished pin to above the ankles symmetrically     Gait, Coordination, and Reflexes     Gait  Gait: normal    Coordination   Romberg: positive    Tremor   Resting tremor: present  Action tremor: absent    Reflexes   Right brachioradialis: 2+  Left brachioradialis: 2+  Right biceps: 2+  Left biceps: 2+  Right triceps: 2+  Left triceps: 2+  Right patellar: 2+  Left patellar: 2+  Right achilles: 0  Left achilles: 0Normal tone, no tremor at rest.  + tremor noted on Archimedes spiral, low amp high frequency tremor of fingers with sustained posture       Physical Exam   Eyes: EOM are normal. Pupils are equal, round, and reactive to light.   Neurological: He has an abnormal Romberg Test. Gait normal.   Reflex Scores:       Tricep reflexes are 2+ on the right side and 2+ on the left side.       Bicep reflexes are 2+ on the right side and 2+ on the left side.       Brachioradialis reflexes are 2+ on the right side and 2+ on the left side.       Patellar reflexes are 2+ on the right side and 2+ on the left side.       Achilles reflexes are 0 on the right side and 0 on the left side.  Psychiatric: His speech is normal.         Data Review:   Results for CHING ROSALES (MRN [id number]) as of 1/3/2018 13:32   Ref. Range 3/1/2017 12:59   WBC Latest Ref Range: 3.90 - 12.70 K/uL 6.71   RBC Latest Ref Range: 4.60 - 6.20 M/uL 3.02 (L)   Hemoglobin Latest Ref Range: 14.0 - 18.0 g/dL 11.0 (L)   Hematocrit Latest Ref Range: 40.0 - 54.0 % 33.1 (L)   MCV Latest Ref Range: 82 - 98 fL 110 (H)   MCH Latest Ref Range: 27.0 - 31.0 pg 36.4 (H)   MCHC Latest Ref Range: 32.0 - 36.0 % 33.2   RDW Latest Ref Range: 11.5 - 14.5 % 15.7 (H)   Platelets Latest Ref Range: 150 - 350  K/uL 209   MPV Latest Ref Range: 9.2 - 12.9 fL 10.3   Coumadin Monitoring INR Unknown 1.1   PT Latest Ref Range: 11.8 - 14.7 sec 13.9   aPTT Latest Ref Range: 24.6 - 36.7 sec 29.8   Sodium Latest Ref Range: 136 - 145 mmol/L 145   Potassium Latest Ref Range: 3.5 - 5.1 mmol/L 3.5   Chloride Latest Ref Range: 95 - 110 mmol/L 103   CO2 Latest Ref Range: 22 - 31 mmol/L 27   Anion Gap Latest Ref Range: 8 - 16 mmol/L 15   BUN, Bld Latest Ref Range: 9 - 21 mg/dL 11   Creatinine Latest Ref Range: 0.50 - 1.40 mg/dL 0.82   eGFR if non African American Latest Ref Range: >60 mL/min/1.73 m^2 >60   eGFR if  Latest Ref Range: >60 mL/min/1.73 m^2 >60   Glucose Latest Ref Range: 70 - 110 mg/dL 83   Calcium Latest Ref Range: 8.4 - 10.2 mg/dL 9.1     MRI brain report from October 2015, indication dementia significant for mild to moderate generalized age-appropriate involutional change.  Minimal white matter signal along the margins of the lateral ventricle and a few scattered focal punctate areas of signal at the subcortical white matter consistent with a minimal chronic microvascular white matter change.      Assessment:       1. Benign essential tremor    2. Idiopathic peripheral neuropathy    3. Mild cognitive impairment        Plan:         Problem List Items Addressed This Visit        Neuro    Benign essential tremor - Primary    Relevant Orders    VITAMIN B1 (Completed)    VITAMIN B12 (Completed)    VITAMIN D (Completed)    Idiopathic peripheral neuropathy    Relevant Orders    VITAMIN B1 (Completed)    VITAMIN B12 (Completed)    VITAMIN D (Completed)    Mild cognitive impairment    Relevant Orders    Ambulatory Referral to Neuropsychology    MRI Brain Without Contrast (Completed)    VITAMIN B1 (Completed)    VITAMIN B12 (Completed)    VITAMIN D (Completed)          Return in about 3 months (around 4/3/2018).        Patient information shared at the time of visit:  I would like you to start taking thiamine 100 mg  once a day, and a Vitamin D3 supplement 1000 units a day. Do whatever is necessary to stop drinking and stay away from any alcohol.  This can contribute to memory loss progression as well as neuropathy.    To get back into a regular sleep routine, try taking melatonin 3 mg one hour before going to sleep each evening, and stick to a regular routine.     a. Attention: Remember that inattention and lack of focus are major culprits to forgetting information so be sure and practice paying attention for adequate learning of information. If you rely on passive attention to remembering something (e.g., yeah, uh-huh approach), youll find you cannot recall it later. I recommend the following to improve attention, which may aid in later recall:   i. Reduce distractions in the area as much as possible.  ii. Look at the person as they are speaking to you.   iii. Paraphrase as they are speaking  iv. Write down important pieces of information   v. Ask them to repeat if you zone out.   vi. Have them simplify and reduce information that you need to attend to during conversation.   vii. Have visual cues to remind you if you need to do something later.  b. Processing Speed:   i. Using multiple modalities (e.g., listening, writing notes, asking questions, recording) to learn new information is likely to allow additional time for processing, thus improving memory for the material.   ii. Allowing sufficient time to complete tasks will reduce frustration and help to ensure completion.  c. Executive Functioning:  i. Dont attempt to multi-task.  Separate tasks so that each can be completed one at a time.  ii. Consider using a calendar/day planner, as that may be effective to help you plan and stay on track.  Color-coding specific tasks by importance may add additional benefit to your planner.  iii. Break down large projects into smaller tasks and write down the steps to completing the task.  Taking notes while reading can help with  recall.  d. Storing Information: Use the below strategies to help you further enhance how information is stored.  i. Rehearse - Immediately after seeing/hearing something, try to recall it.  Wait a few minutes, then check again.  Gradually lengthen the intervals between rehearsals.  ii. Repetition of learned material is critical to ensure storage of information to be learned. Self-test at home to ensure learning.  iii. Write down important information to improve your attention and focus and to have something to look back on when you need to recall it.  iv. Make sure the person doesnt rattle off, but presents in a clear, logical, and unhurried manner.   e. Recalling Information:  i. Jog your memory - Lose something?  Think back to when you last had it.  What did you do next?  And after that?  Mentally walk yourself through each activity that followed.  Prodding your memory this way may enable you to recall the location of the missing item.  ii. Use a cue - Symbolic reminders (the proverbial string around the finger) are helpful.  So too are memos, timers, calendar notes, etc.--keep them in visible, appropriate places.  iii. Get organized - Have fixed locations for all important papers, key phone numbers, medications, keys, wallet, glasses, tools, etc.  iv. Develop routines - Routines can anchor memories so they do not drift away.    f. Sleep Hygiene: Poor sleep has a negative effect on cognition. Several strategies have been shown to improve sleep:   i. Caffeine intake in the afternoon and evening, as well as stuffing oneself at supper, can decrease the quality of restful sleep throughout the night.   ii. Bedtime and wake-up times should be consistent every night and morning so the body becomes used to a single routine, even on the weekends.  iii. Engage in daily physical activity, but not 2-3 hours before bedtime.   iv. No technology use (television, computer, iPad) 1-2 hours before bed.   v. Have a wind down  routine (e.g., soft lights in the house, bath before bed, reduced fluid intake, songs, reading, less noise) to promote sleep readiness.   vi. Visit the www.sleepfoundation.org for more strategies.   g. Practice good cognitive hygiene:  i. Engage in regular exercise, which increases alertness and arousal and can improve attention and focus.  Consider lower impact exercises, such as yoga or light walking.  ii. Get a good nights sleep, as this can enhance alertness and cognition.  iii. Eat healthy foods and balanced meals. It is notable that research indicates certain nutrients may aid in brain function, such as B vitamins (especially B6, B12, and folic acid), antioxidants (such as vitamins C and E, and beta carotene), and Omega-3 fatty acids. Talk with your physician or nutritionist about whats right for you.   iv. Keep your brain active. Find activities to stay mentally active, such as reading, games (cards, checkers), puzzles (crosswords, Sudoku, jig saw), crafts (models, woodworking), gardening, or participating in activities in the community.  v. Stay socially engaged. Continue staying active with your family and friends.      Thank you very much for the opportunity to assist in this patient's care.  If you have any questions or concerns, please do not hesitate to contact me at any time.     Over 60 minutes time spent with patient, > 50% in discussion and counseling with patient regarding diagnosis, prognosis and treatment strategies      Sincerely,  Christopher Chávez, DO

## 2018-01-03 NOTE — LETTER
January 8, 2018      Aydin Rojo MD  [address] [facility] [address] [place] [address]           [facility]  [address] [facility] [address] [place] [address]  Phone: [phone]  Fax: [phone]          Patient: Vikas Angelo   MR Number: [id number]   YOB: 1950   Date of Visit: 1/3/2018       Dear Dr. Aydin Rojo:    Thank you for referring Vikas Angelo to me for evaluation. Attached you will find relevant portions of my assessment and plan of care.    If you have questions, please do not hesitate to call me. I look forward to following Vikas Angelo along with you.    Sincerely,    Kylee Anaya  CC:  No Recipients    If you would like to receive this communication electronically, please contact [email or web address] or [phone] to request more information on [employer] access.    For providers and/or their staff who would like to refer a patient to [facility], please contact us through our one-stop-shop provider referral line, [facility], at [phone].    If you feel you have received this communication in error or would no longer like to receive these types of communications, please e-mail [email or web address]

## 2018-01-04 ENCOUNTER — TELEPHONE (OUTPATIENT)
Dept: NEUROLOGY | Facility: CLINIC | Age: 68
End: 2018-01-04

## 2018-01-04 NOTE — TELEPHONE ENCOUNTER
Returned call and spoke with patient. He stated Dr. Chávez wanted him to have Neuropsych testing done and was instructed the he would have to call. No phone number was given to the patient. Phone number given to patient to call. Informed patient to keep his appointment date and time in a safe place because we aren't able to see it. Patient verbalized understanding.

## 2018-01-04 NOTE — TELEPHONE ENCOUNTER
----- Message from Navid Herrera sent at 1/4/2018  3:30 PM CST -----  Contact: Patient  Patient had called in and wanted to schedule his labs and there are test in the system also.  He had some questions as to what kind of labs it is and what tests he needs to take.  The MRI is scheduled but he stated that there are more.  Can you please call the patient back at [phone].  Thank you

## 2018-01-06 LAB — VIT B1 SERPL-MCNC: 65 UG/L (ref 38–122)

## 2018-01-09 ENCOUNTER — TELEPHONE (OUTPATIENT)
Dept: NEUROLOGY | Facility: CLINIC | Age: 68
End: 2018-01-09

## 2018-01-09 NOTE — TELEPHONE ENCOUNTER
----- Message from Jovi Duncan sent at 1/8/2018 12:25 PM CST -----  Contact: self  [phone]  Patient called asking to discuss the three supplements patient should be taking. Patient asking to discuss scheduling the written psychiatrist test.Thanks!

## 2018-01-09 NOTE — TELEPHONE ENCOUNTER
"Patient state's, " he has the questions about his supplements answered.  He is having neuro psych testing tomorrow.  Appointment for results rescheduled to a sooner date.  "

## 2018-01-10 ENCOUNTER — OFFICE VISIT (OUTPATIENT)
Dept: PSYCHIATRY | Facility: CLINIC | Age: 68
End: 2018-01-10
Payer: COMMERCIAL

## 2018-01-10 DIAGNOSIS — R41.89 COGNITIVE IMPAIRMENT: Primary | ICD-10-CM

## 2018-01-10 DIAGNOSIS — G31.84 MILD COGNITIVE IMPAIRMENT: ICD-10-CM

## 2018-01-10 DIAGNOSIS — R41.3 MEMORY LOSS: ICD-10-CM

## 2018-01-10 PROCEDURE — 96118 PR NEUROPSYCH TESTING BY PSYCH/PHYS: CPT | Mod: 59,S$GLB,, | Performed by: PSYCHOLOGIST

## 2018-01-10 PROCEDURE — 90791 PSYCH DIAGNOSTIC EVALUATION: CPT | Mod: S$GLB,,, | Performed by: PSYCHOLOGIST

## 2018-01-10 PROCEDURE — 96119 PR NEUROPSYCH TESTING BY TECHNICIAN: CPT | Mod: 59,S$GLB,, | Performed by: PSYCHOLOGIST

## 2018-01-11 ENCOUNTER — HOSPITAL ENCOUNTER (OUTPATIENT)
Dept: RADIOLOGY | Facility: HOSPITAL | Age: 68
Discharge: HOME | End: 2018-01-11
Attending: PSYCHIATRY & NEUROLOGY
Payer: COMMERCIAL

## 2018-01-11 DIAGNOSIS — G31.84 MILD COGNITIVE IMPAIRMENT: ICD-10-CM

## 2018-01-11 PROCEDURE — 70551 MRI BRAIN STEM W/O DYE: CPT | Mod: 26,,, | Performed by: RADIOLOGY

## 2018-01-11 PROCEDURE — 70551 MRI BRAIN STEM W/O DYE: CPT | Mod: TC,PO

## 2018-01-14 ENCOUNTER — TELEPHONE (OUTPATIENT)
Dept: PSYCHIATRY | Facility: CLINIC | Age: 68
End: 2018-01-14

## 2018-01-14 NOTE — PSYCH TESTING
[facility] [address] [place]  [address]  [phone]    REPORT OF NEUROPSYCHOLOGICAL EVALUATION    NAME:  Vikas Angelo  OC #: [id number]  :  1950    REFERRED BY:  Christopher Chávez D.O.    EVALUATED BY:  Lynne Bond, Ph.D., Clinical Psychologist  Karen Arroyo, Ph.D., Psychometrician    DATE OF EVALUATION: 1/10/2018    EVALUATION PROCEDURES AND TIME:  Conducted by Psychologist:  Interpretation and report of test data  Review and integration of diagnostic interview, medical record, and test data  Conducted by Technician:  Wechsler Abbreviated Test of Intelligence (WASI)  Temporal Orientation Test   Repeatable Battery for the Assessment of Neuropsychological Status (RBANS)  [place] Naming Test (BNT)  Complex Ideational Material (verbal comprehension)  Controlled Oral Word Association Test (COWAT)  Duncan Visual Motor Gestalt Test, intersecting pentagons, 3-D cube, and clock face drawings  Wisconsin Card Sorting Test - 64 (WCST-64)  Trail Making Test, Parts A & B  Stroop Color Word Test  Luria Frontal-Motor Test  Wechsler Memory Scale IV Logical Memory and Visual Reproduction tests (WMS-LM and WMS-VR)  Springer Depression Inventory - II (BDI-II)  Springer Anxiety Inventory (YAQUELIN)  Time: CPT Code 27586 - 2 hours; CPT Code 25026 - 3 hours    EVALUATION FINDINGS:  The diagnostic interview revealed that 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 in finance and used to have an extraordinary memory. Short-term memory is more affected than long term memory. He reported his  comments on his memory decline. He reported that he often forgets what he is doing mid-task. When running errands, he has to make a list and take it with him or he will forget what he is supposed to be doing/buying.  He also finds that he participates less  in conversation now. Upon direct questioning, he also reported that he forgets conversations and events; and forgets what he reads. He does not cook. He reported he has no difficult managing his household, medications, and finances independently. He reported no language difficulties. He reported it seems to be getting worse over time. No precipitant could be identified. However, he worried that his drinking might be contributing to memory loss (2-2 ½ drinks per day), so he quit drinking at the beginning of this year. He is currently taking Namenda and Aricept. There is no family history memory problems/dementia. Mr. Angelo has no prior psychiatric history. He also denied current adjustment problems. He was pleasant and cooperative in interview. The Mental Status Exam suggested reduced orientation to place, recent memory, and fund of general knowledge.      The medical record also revealed mild cognitive impairment, memory loss, benign essential tremor, peripheral neuropathy, gout, hyperlipidemia, and HTN. MRI of the brain completed on 1/11/2018 yielded these results: 1. No acute intracranial abnormality is seen. 2. Mild, nonspecific supratentorial white matter disease, most commonly secondary to small vessel disease such as arteriolosclerosis.      TEST DATA:  Neuropsychological tests administered by the technician revealed that the patient reported he has a PhD in finance and was a B+ student.  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 He retired from that work age 56 or 57. He was alert and cooperative during the evaluation.  Effort on all tests was satisfactory to produce valid results.    He was oriented to month, year, and date, but missed the day of the week by 2 days and the time by > 1.5 hours. His score on this measure suggested impairment in temporal orientation.    Mr. Angelo Full-Scale IQ estimate was 125 (in the Superior range), with a Verbal IQ estimate of 125 (in the  Superior range) and a Performance IQ estimate of 119 (in the High Average range). His verbal abilities were better than his performance abilities. The verbal IQ score on the WASI is derived from tasks that tend to be resistant to decline in cognitive function (vocabulary and similarities) while the performance IQ tasks (block design and matrix reasoning) are more susceptible to decline.     Mr. Angelo obtained total score of 93 on the RBANS, which is at the 32nd percentile, suggesting no impairment in general cognitive functioning.  Five areas were tested.  The Immediate Memory Index revealed mild impairment in the ability to remember verbal information immediately after it is presented, with a score of 85 at the 15th percentile.  The Visuospatial/Constructional Index revealed superior performance in the ability to perceive spatial relations and to construct a spatially accurate copy of a drawing, with a score of 131 at the 98th percentile. The Language Index revealed no impairment in the ability to respond verbally to either naming or retrieving learned material, with a score of 90 at the 25th percentile. Naming was average, but semantic fluency was mildly impaired. Attention, or the capacity to remember and manipulate both visually and orally presented information in short-term storage, was in the high average range, with a score of 115 at the 84th percentile. Delayed memory, or anterograde memory capacity, was severely impaired, with a score of 56 at the <1st percentile.  List recall, list recognition, story recall, and figure recall were all significantly impaired. For reference, the expected average score on each index is 100, which is at the 50th percentile.     Naming, as assessed by the BNT, was not impaired with a score of 57/60 correct without cueing (47th percentile).      Verbal comprehension, as assessed by Complex Ideational Material, was not impaired with a score of 12/12 (63rd percentile).     On  the Verbal Fluency (COWAT) task, he listed 52words beginning with the letters F, A and S in 3 minutes (average, 75th percentile) and listed 18 words in the semantic category animals in 1 minute (average, 25thpercentile). While both phonemic and semantic fluency were within the average range they were at the extremes with phonemic fluency being 1.5 standard deviations higher than semantic fluency. This type of discrepancy is sometimes seen in persons experiencing cognitive decline such as mild cognitive impairment or dementia.     Drawings made on the Duncan Visual Motor Gestalt Test, intersecting pentagons, 3-D cube, and clock face drawings suggested no impairment in constructional abilities.    The WCST-64 was administered to assess abstract reasoning and conceptualization.  His categories completed score (2) was in the mildly impaired range. His total errors score (23) was in the mildly impaired range and his perseverative errors score (8) was in the average range.  His performance suggested mild impairment in abstract reasoning skills.    His score on the Trail Making Test, Part A, (30 seconds for completion) indicated that psychomotor speed was in the average range (47th percentile).  His score on Part B (56 seconds for completion) indicated that his ability to handle complex relationships which require rapid change of set, or mental flexibility, was in the average range (70th percentile).    The Stroop Color Word test predicted color word score compared to his measured color word score was in the low average range, 9th percentile, suggesting mild difficulty in concentration effectiveness/inhibition.     The Luria Frontal-Motor Test was administered to further assess executive functioning/perseveration. He had no significant difficulty performing actions with both hands simultaneously. He was able to learn a series of hand gestures without much difficulty although he had to say knuckle, flat, up to himself  in order to perform accurately. His performance overall revealed no significant impairment in executive functioning/perseveration.    The WMS-IV LMVR was administered to further assess memory.  His Immediate Memory Index of 99 was in the average range. His Delayed Memory Index of 40 was in the severely impaired range. His Auditory Memory Index of 75 was in the moderately impaired range and his Visual Memory Index of 79was in the mildly impaired range. The expected average score for each index is 100. Scaled scores of the memory indexes revealed average immediate auditory/verbal memory and immediate visual memory, but delayed auditory/verbal memory and delayed visual memory were in the severely impaired range.    The BDI-II was administered to assess for depression.  His score of 7 suggested minimal depression was present. The YAQUELIN was administered to assess for anxiety. His score of 6 suggested minimal anxiety was present.    SUMMARY AND RECOMMENDATIONS:  Mr. Angelo was referred for Neuropsychological Evaluation on an outpatient basis due to subjective memory decline for the past couple of years.  His intellectual abilities were estimated to be in the superior range, with verbal abilities better than performance abilities. His general cognitive abilities as assessed by the RBANS were within the average range, with severe impairment in delayed memory; mild impairment in immediate verbal memory; and average functioning in visuospatial/constructional abilities, language (with reduced semantic fluency), or attention.  Further assessment of specific cognitive abilities revealed mild deficits in temporal orientation, abstract reasoning, and concentration effectiveness/inhibition, and with intact naming, verbal comprehension, verbal fluency, constructional abilities, frontal motor skills, psychomotor speed, and mental flexibility. Additional memory assessment revealed relatively intact immediate memory with severely  impaired delayed memory, consistent with the findings on the RBANS. Personality test data suggested the presence of minimal symptoms associated with depression and anxiety.  Neuropsychological testing revealed impairment in delayed auditory/verbal memory, abstraction ability, and concentration effectiveness/inhibition; and variability in immediate auditory/verbal memory (performances in the average and impaired ranges). Mr. Angelo does not report any significant decline in functional abilities at this time. He currently appears to have amnestic mild cognitive impairment which is a risk factor for Alzheimers disease. He is already being treated with Aricept and Namenda which may help to slow decline and reduce risk of converting from mild cognitive impairment to dementia. Mr. Angelo and his  will discuss the results of this assessment and his MRI scan with Dr. Chávez to determine if any changes in his treatment plan are warranted at this time.        Interpretation and report and coding were completed on 1/14/2018.

## 2018-01-14 NOTE — PROGRESS NOTES
Psychiatry Initial Visit (PhD/LCSW)    Date: 1/10/2018    CPT Code: 43873    Referred by: Christopher Chávez D.O.    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 in finance and used to have an extraordinary memory. Short-term memory is more affected than long term memory. He reported his  comments on his memory decline. He reported that he often forgets what he is doing mid-task. When running errands, he has to make a list and take it with him or he will forget what he is supposed to be doing/buying.  He also finds that he participates less in conversation now. Upon direct questioning, he also reported that he forgets conversations and events; and forgets what he reads. He does not cook. He reported he has no difficult managing his household, medications, and finances independently. He reported no language difficulties. He reported it seems to be getting worse over time. No precipitant could be identified. However, he worried that his drinking might be contributing to memory loss (2-2 ½ drinks per day), so he quit drinking at the beginning of this year. He is currently taking Namenda and Aricept. There is no family history memory problems/dementia. Mr. Angelo has no prior psychiatric history. He also denied current adjustment problems. He was pleasant and cooperative in interview.     Pain scale: noncontributory    Symptoms:  Mood: denied  Anxiety:  denied  Substance abuse: denied  Cognitive functioning:  memory decline    Psychiatric history: none    Medical history: mild cognitive impairment, memory loss, benign essential tremor, peripheral neuropathy, gout, hyperlipidemia, and HTN. MRI of the  brain completed on 1/11/2018 yielded these results: 1. No acute intracranial abnormality is seen. 2. Mild, nonspecific supratentorial white matter disease, most commonly secondary to small vessel disease such as arteriolosclerosis.      Family history of psychiatric illness: none     Social history (marriage, employment, etc.):  Ph.D in finance. B+ student. 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 More recently worked for [employer] after [employer] [place] until 5-7 years ago.  twice. One child with wife from first marriage. Now  for second time to  of 2 years. Lives with  who works offshore.    Substance use:   Alcohol: used to drink 2-2 ½ drinks per day - quit 10 days ago   Drugs: MJ, pills in college   Tobacco: quit smoking 20 years ago   Caffeine: none    Strengths and Liabilities:   Strength:  Pt is expressive/articulate.   Liability:  Pt has subjective memory loss.    Mental Status Exam:  General appearance:  appears stated age, neatly dressed, well groomed  Speech:  normal rate and tone  Level of cooperation:  cooperative  Thought processes:  logical, goal-directed  Mood:  euthymic  Thought content:  no illusions, no visual hallucinations, no auditory hallucinations, no delusions, no active or passive homicidal thoughts, no active or passive suicidal ideation, no obsessions, no compulsions, no violence  Affect:  appropriate  Orientation:  oriented to person and time, but not to palce  Memory:  Recent memory:  2 of 3 objects after brief delay.    Remote memory - intact  Attention span and concentration:  spelled HOUSE forward and backwards  Fund of general knowledge: 3 of 4 recent presidents  Abstract reasoning:    Similarities: abstract.    Proverbs: abstract.  Judgment and insight: fair  Language:  intact    Diagnostic impressions:        Cognitive impairment R41.89  Memory loss R41.3  Mild cognitive impairment G31.84      Plan:  Pt will complete Neuropsychological testing.  Report of Neuropsychological Evaluation will follow. It can be accessed through the Chart Review activity in Epic under the Notes tab.  It will be titled Psych Testing.    Return to clinic:  as scheduled    Length of time:  45 minutes

## 2018-01-14 NOTE — TELEPHONE ENCOUNTER
Report of Neuropsychological Evaluation is completed. It can be accessed through the Chart Review activity in Epic under the Notes tab to the right of the Encounters tab.  It is titled Psych Testing.   Thanks. EC

## 2018-02-06 ENCOUNTER — OFFICE VISIT (OUTPATIENT)
Dept: NEUROLOGY | Facility: CLINIC | Age: 68
End: 2018-02-06
Payer: COMMERCIAL

## 2018-02-06 VITALS
RESPIRATION RATE: 17 BRPM | SYSTOLIC BLOOD PRESSURE: 139 MMHG | DIASTOLIC BLOOD PRESSURE: 67 MMHG | HEIGHT: 67 IN | BODY MASS INDEX: 33.3 KG/M2 | HEART RATE: 69 BPM | WEIGHT: 212.19 LBS

## 2018-02-06 DIAGNOSIS — G60.9 IDIOPATHIC PERIPHERAL NEUROPATHY: ICD-10-CM

## 2018-02-06 DIAGNOSIS — G25.0 BENIGN ESSENTIAL TREMOR: ICD-10-CM

## 2018-02-06 DIAGNOSIS — G31.84 MILD COGNITIVE IMPAIRMENT: Primary | ICD-10-CM

## 2018-02-06 PROCEDURE — 99999 PR PBB SHADOW E&M-EST. PATIENT-LVL IV: CPT | Mod: PBBFAC,,, | Performed by: PSYCHIATRY & NEUROLOGY

## 2018-02-06 PROCEDURE — 1125F AMNT PAIN NOTED PAIN PRSNT: CPT | Mod: S$GLB,,, | Performed by: PSYCHIATRY & NEUROLOGY

## 2018-02-06 PROCEDURE — 1159F MED LIST DOCD IN RCRD: CPT | Mod: S$GLB,,, | Performed by: PSYCHIATRY & NEUROLOGY

## 2018-02-06 PROCEDURE — 3008F BODY MASS INDEX DOCD: CPT | Mod: S$GLB,,, | Performed by: PSYCHIATRY & NEUROLOGY

## 2018-02-06 PROCEDURE — 99215 OFFICE O/P EST HI 40 MIN: CPT | Mod: S$GLB,,, | Performed by: PSYCHIATRY & NEUROLOGY

## 2018-02-06 NOTE — PATIENT INSTRUCTIONS
To prevent further memory loss, some of the best preventative measures are following a healthy diet, getting regular exercise, and ensuring good sleep habits.  Approximately 30 to 45 minutes of brisk physical activity (brisk walking, swimming, stationary bicycle, etc.) 5 days a week has been shown to improve function in vascular dementias, and can lower the risk of stroke and slow progression of memory loss.    A Mediterranean style diet, or the DASH diet with lots of fresh fruits and vegetables, whole grains, more fish and chicken, less red meat, less dairy, less processed foods is also beneficial. For more information see:     https://[email or web address]/diet-may-help-prevent-alzheimers     Minimize or eliminate the use of alcohol, and discuss any prescription medications you might be taking with your doctor to avoid medications which can cause sedation or worsen cognitive function.    Establish a regular, consistent sleep pattern and practice good sleep hygiene.  Avoid screen time (computer, TV, smartphones or tablets) or heavy meals for at least an hour before bedtime, and avoid caffeine or stimulants after 2 PM. Exercise earlier in the day or mornings and keep your sleeping environment comfortable.     Getting out socializing with friends and family and staying both mentally and physically active is also very important. Continue with hobbies or activities that are engaging and practice activities that are mentally stimulating (word puzzles, Sudoku, etc) and stay active in the community.    Some supplements which we may recommend include:  - omega-3 fatty acids with sufficient amounts of EPA and DHA, 1000 to 2000 mg a day   - Vitamin D supplement 2000 units (IU) daily   - Green tea and green tea extracts may also help (caffeine free)   - supplements containing Phosphatidylserine (PS) and phosphatidylcholine (PC)     There may be some benefit to these, however there is no definitive evidence to support the  prevention of cognitive impairment or dementia.     a. Attention: Remember that inattention and lack of focus are major culprits to forgetting information so be sure and practice paying attention for adequate learning of information. If you rely on passive attention to remembering something (e.g., yeah, uh-huh approach), youll find you cannot recall it later. I recommend the following to improve attention, which may aid in later recall:   i. Reduce distractions in the area as much as possible.  ii. Look at the person as they are speaking to you.   iii. Paraphrase as they are speaking  iv. Write down important pieces of information   v. Ask them to repeat if you zone out.   vi. Have them simplify and reduce information that you need to attend to during conversation.   vii. Have visual cues to remind you if you need to do something later.  b. Processing Speed:   i. Using multiple modalities (e.g., listening, writing notes, asking questions, recording) to learn new information is likely to allow additional time for processing, thus improving memory for the material.   ii. Allowing sufficient time to complete tasks will reduce frustration and help to ensure completion.  c. Executive Functioning:  i. Dont attempt to multi-task.  Separate tasks so that each can be completed one at a time.  ii. Consider using a calendar/day planner, as that may be effective to help you plan and stay on track.  Color-coding specific tasks by importance may add additional benefit to your planner.  iii. Break down large projects into smaller tasks and write down the steps to completing the task.  Taking notes while reading can help with recall.  d. Storing Information: Use the below strategies to help you further enhance how information is stored.  i. Rehearse - Immediately after seeing/hearing something, try to recall it.  Wait a few minutes, then check again.  Gradually lengthen the intervals between rehearsals.  ii. Repetition of  learned material is critical to ensure storage of information to be learned. Self-test at home to ensure learning.  iii. Write down important information to improve your attention and focus and to have something to look back on when you need to recall it.  iv. Make sure the person doesnt rattle off, but presents in a clear, logical, and unhurried manner.   e. Recalling Information:  i. Jog your memory - Lose something?  Think back to when you last had it.  What did you do next?  And after that?  Mentally walk yourself through each activity that followed.  Prodding your memory this way may enable you to recall the location of the missing item.  ii. Use a cue - Symbolic reminders (the proverbial string around the finger) are helpful.  So too are memos, timers, calendar notes, etc.--keep them in visible, appropriate places.  iii. Get organized - Have fixed locations for all important papers, key phone numbers, medications, keys, wallet, glasses, tools, etc.  iv. Develop routines - Routines can anchor memories so they do not drift away.    f. Sleep Hygiene: Poor sleep has a negative effect on cognition. Several strategies have been shown to improve sleep:   i. Caffeine intake in the afternoon and evening, as well as stuffing oneself at supper, can decrease the quality of restful sleep throughout the night.   ii. Bedtime and wake-up times should be consistent every night and morning so the body becomes used to a single routine, even on the weekends.  iii. Engage in daily physical activity, but not 2-3 hours before bedtime.   iv. No technology use (television, computer, iPad) 1-2 hours before bed.   v. Have a wind down routine (e.g., soft lights in the house, bath before bed, reduced fluid intake, songs, reading, less noise) to promote sleep readiness.   vi. Visit the www.sleepfoundation.org for more strategies.   g. Practice good cognitive hygiene:  i. Engage in regular exercise, which increases alertness and arousal  and can improve attention and focus.  Consider lower impact exercises, such as yoga or light walking.  ii. Get a good nights sleep, as this can enhance alertness and cognition.  iii. Eat healthy foods and balanced meals. It is notable that research indicates certain nutrients may aid in brain function, such as B vitamins (especially B6, B12, and folic acid), antioxidants (such as vitamins C and E, and beta carotene), and Omega-3 fatty acids. Talk with your physician or nutritionist about whats right for you.   iv. Keep your brain active. Find activities to stay mentally active, such as reading, games (cards, checkers), puzzles (crosswords, Sudoku, jig saw), crafts (models, woodworking), gardening, or participating in activities in the community.  v. Stay socially engaged. Continue staying active with your family and friends.      Please look into the following websites, to help you find resources including day programs, caregivers and caregiver support, legal questions, support groups, etc.    [facility]  ADDRESS  Mailing Address:  [address] [place] [address] Street Address:  [address] [place] [address]   Web Address:  [email or web address]     Services offered at this location:  Chore, Congregate Meals, Home Delivered Meals, Homemaker, Information and Assistance, Legal, Material Aid, Medical Alert, Medication Management, NFCSP Information and Assistance, NFCSP In-Home Respite, NFCSP Material Aid, NGCSP Personal Care , NFCSP Public Education, NFCSP Sitter Service, NFCSP Support Groups, Nutrition Counseling, Nutrition Education, Outreach, Recreation, Transportation, Utility Assistance, Wellness, Area Agency on Aging, [place] on Aging      Peace With Dementia: http://carepartnermentoring.com/    Website for Area Agency on Ageing: http://goea.[place].gov/index.cfm?md=tha&tmp=category&catID=38&nid=24&ssid=0&startIndex=1    Website for the Alzheimers Association:   http://www.alz.org/

## 2018-02-06 NOTE — PROGRESS NOTES
Subjective:          Patient ID: Vikas Angelo is a 67 y.o.  male who presents for follow up of tremor and memory loss    Initial / Last History of Present Illness:    1/3/18:    Reviewed records in Epic.  Patient has a history of cervical spinal fusion, recently had EMG/nerve conduction study of the left arm which showed ulnar neuropathy and chronic moderate left C6 radiculopathy. Presented to [facility] in Oct 2016 with LUE weakness and pain.      Earlier in the year, neurosurgery obtain multiple studies and determined he may get some benefit from laminar foraminotomy at C6-7 and C7-T1, as he may have had some combination of cervical radiculopathy and peripheral nerve dysfunction in the upper extremities.  Had surgery in March 2017.      He is referred today for evaluation and management of early onset dementia and tremor.     Previously had seen Dr. Lai in [place], [place] and has been on memantine and donepezil.      Tremor - does not interfere particularly with daily activities, more of a nuisance.  Constant, equal in both hands.  Not aware of any exacerbating factors, does not drink caffeine.  Not aware of any family hx.  He is cutting back on alcohol - has quit a couple of times in the past.  Does not feel this has been problematic, but has been concerned about his intake previously.       He is here with his .  Feels he has always had good memory up until a few years ago, then started to fade. He is concerned about potential medications exacerbating his memory.  Also concerned about impact of long term drinking as contributing.  STM>LTM.     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   does most of the household accounting.   is a professional , has always done all the cooking.  No falls.       + repeating questions, can be as soon as 15 minutes later.  Forgets conversations, dates.  He is using a calendar to keep organized.  About a couple of  years ago      Does not exercise regularly.  Eats reasonably well, but not very often.  Sleeps often during the day, would tend to be awake at night, may be sleeping more than usual.       + peripheral neuropathy - at most 5-6 years, pins and needles, on GBP    Interval history since last visit:    Here today to review results of neuropsychological testing.  Reviewed results of his testing and note from January 10, 2018.  Although his intellectual abilities were estimated to be in the superior range with an IQ of 125, and his general cognitive abilities by the RBANS were in the average range, he did show severe impairments in delayed memory consistent with an amnestic mild cognitive impairment.    The MRI of the brain did show some areas of volume loss/atrophy primarily involving the parietal lobes, with only minimal chronic small vessel changes.    Here again with his  - he did stop drinking 5 weeks ago and has noted some fog lifting. We talked about his test results and MRI, reviewed images together and discussed treatment and diagnostic in detail.      Review of patient's allergies indicates:  No Known Allergies  Current Outpatient Prescriptions   Medication Sig Dispense Refill    allopurinol (ZYLOPRIM) 300 MG tablet Take 300 mg by mouth once daily.      amlodipine (NORVASC) 5 MG tablet TAKE 1 TABLET TWICE DAILY      aspirin (ECOTRIN) 81 MG EC tablet Take 81 mg by mouth once daily.      donepezil (ARICEPT) 10 MG tablet Take 10 mg by mouth.      ferrous sulfate 324 mg (65 mg iron) TbEC Take 325 mg by mouth once daily.      fluticasone (FLONASE) 50 mcg/actuation nasal spray 1 spray by Each Nare route as needed.       folic acid (FOLVITE) 800 MCG Tab Take 800 mcg by mouth once daily.      gabapentin (NEURONTIN) 300 MG capsule TAKE 2 CAPSULES (600 MG TOTAL) BY MOUTH 2 (TWO) TIMES DAILY.      hydrocortisone (ANUSOL-HC) 2.5 % rectal cream Place rectally 2 (two) times daily.      ibuprofen  (ADVIL,MOTRIN) 800 MG tablet TAKE 1 TABLET (800 MG TOTAL) BY MOUTH 2 (TWO) TIMES DAILY AS NEEDED FOR PAIN.      meloxicam (MOBIC) 15 MG tablet Take 15 mg by mouth daily as needed for Pain.      memantine (NAMENDA) 10 MG Tab Take 5 mg by mouth 2 (two) times daily.      MULTIVIT-IRON-MIN-FOLIC ACID 3,500-18-0.4 UNIT-MG-MG ORAL CHEW Take 1 tablet by mouth every morning.       niacin, inositol niacinate, 500 mg Tab Take 1 tablet by mouth.      sildenafil (VIAGRA) 50 MG tablet Take 50 mg by mouth as needed.      vitamin E 400 UNIT capsule Take 400 Units by mouth once daily.       No current facility-administered medications for this visit.          Review of Systems  Review of Systems    Objective:        Vitals:    02/06/18 0940   BP: 139/67   Pulse: 69   Resp: 17     Body mass index is 33.24 kg/m².  Neurologic Exam     Mental Status   Oriented to person, place, and time.   Attention: normal. Concentration: normal.   Speech: speech is normal   Level of consciousness: alert  Able to name object. Able to repeat. Normal comprehension.       Physical Exam   Constitutional: He is oriented to person, place, and time. He appears well-developed and well-nourished.   HENT:   Head: Normocephalic and atraumatic.   Neurological: He is oriented to person, place, and time.   Psychiatric: He has a normal mood and affect. His speech is normal and behavior is normal. Judgment and thought content normal.   Vitals reviewed.        Data Review:    Narrative     EXAM: Brain MRI without contrast.    INDICATION: Memory loss, mild cognitive impairment    TECHNIQUE: Routine multiplanar, multisequence brain MRI was performed. DWI was performed. ADC map was generated.     COMPARISON: None      FINDINGS:     Intracranial contents: There is generalized volume loss that is parietal lobe predominant.  There is no acute adenopathy.  There is no intracranial hemorrhage or mass effect.  There is no hydrocephalus or midline shift.  There are no  regions of restricted diffusion to suggest acute infarction.  There is minimal periventricular and subcortical white matter T2 prolongation.  These findings likely reflect sequelae of minimal chronic microvascular ischemic disease.  There is no abnormal extra-axial fluid collection.  The basilar cisterns are open.  The right vertebral artery is very small in caliber.  This likely represents a developmentally hypoplastic vessel which may partially terminate in a PICA.  The left vertebral artery is dominant and supplies a normal caliber basilar artery.  Flow void indicating patency are present in the anterior circulation.  The cerebellar tonsils are in normal position.  The sellar structures are normal.  The orbits are grossly normal.    Extracranial contents: Marrow signal intensity is within normal limits.  There is trace mucosal thickening in the ethmoid air cells.  Otherwise, the paranasal sinuses and mastoid air cells are clear.   Impression          1. There is no acute abnormality.  There is parietal lobe predominant volume loss with only minimal nonspecific white matter change.  There is no hemorrhage, mass, acute infarction.      Electronically signed by: Dr. Jerman Breen  Date: 01/11/18  Time: 08:38        Results for CHING ROSALES (MRN [id number]) as of 2/6/2018 08:30   Ref. Range 1/3/2018 15:02   Vitamin B-12 Latest Ref Range: 210 - 950 pg/mL >2000 (H)   Thiamine Latest Ref Range: 38 - 122 ug/L 65   Vit D, 25-Hydroxy Latest Ref Range: 30 - 96 ng/mL 32     Assessment:        1. Mild cognitive impairment    2. Benign essential tremor    3. Idiopathic peripheral neuropathy           Plan:         Problem List Items Addressed This Visit        Neuro    Benign essential tremor    Idiopathic peripheral neuropathy    Mild cognitive impairment - Primary    Relevant Orders    FL Lumbar Puncture (xpd)    CSF cell count with differential    Glucose, CSF    Protein, CSF    Miscellaneous Sendout Test Cerebrospinal  Fluid    Lyme Disease Serology, CSF    Herpes Simplex (HSV) by PCR, CSF    Neuron Specific Enolase, CSF      To prevent further memory loss, some of the best preventative measures are following a healthy diet, getting regular exercise, and ensuring good sleep habits.  Approximately 30 to 45 minutes of brisk physical activity (brisk walking, swimming, stationary bicycle, etc.) 5 days a week has been shown to improve function in vascular dementias, and can lower the risk of stroke and slow progression of memory loss.    A Mediterranean style diet, or the DASH diet with lots of fresh fruits and vegetables, whole grains, more fish and chicken, less red meat, less dairy, less processed foods is also beneficial. For more information see:     https://www.rush.[facility]/news/diet-may-help-prevent-alzheimers     Minimize or eliminate the use of alcohol, and discuss any prescription medications you might be taking with your doctor to avoid medications which can cause sedation or worsen cognitive function.    Establish a regular, consistent sleep pattern and practice good sleep hygiene.  Avoid screen time (computer, TV, smartphones or tablets) or heavy meals for at least an hour before bedtime, and avoid caffeine or stimulants after 2 PM. Exercise earlier in the day or mornings and keep your sleeping environment comfortable.     Getting out socializing with friends and family and staying both mentally and physically active is also very important. Continue with hobbies or activities that are engaging and practice activities that are mentally stimulating (word puzzles, Sudoku, etc) and stay active in the community.    Some supplements which we may recommend include:  - omega-3 fatty acids with sufficient amounts of EPA and DHA, 1000 to 2000 mg a day   - Vitamin D supplement 2000 units (IU) daily   - Green tea and green tea extracts may also help (caffeine free)   - supplements containing Phosphatidylserine (PS) and phosphatidylcholine  (PC)     There may be some benefit to these, however there is no definitive evidence to support the prevention of cognitive impairment or dementia.     a. Attention: Remember that inattention and lack of focus are major culprits to forgetting information so be sure and practice paying attention for adequate learning of information. If you rely on passive attention to remembering something (e.g., yeah, uh-huh approach), youll find you cannot recall it later. I recommend the following to improve attention, which may aid in later recall:   i. Reduce distractions in the area as much as possible.  ii. Look at the person as they are speaking to you.   iii. Paraphrase as they are speaking  iv. Write down important pieces of information   v. Ask them to repeat if you zone out.   vi. Have them simplify and reduce information that you need to attend to during conversation.   vii. Have visual cues to remind you if you need to do something later.  b. Processing Speed:   i. Using multiple modalities (e.g., listening, writing notes, asking questions, recording) to learn new information is likely to allow additional time for processing, thus improving memory for the material.   ii. Allowing sufficient time to complete tasks will reduce frustration and help to ensure completion.  c. Executive Functioning:  i. Dont attempt to multi-task.  Separate tasks so that each can be completed one at a time.  ii. Consider using a calendar/day planner, as that may be effective to help you plan and stay on track.  Color-coding specific tasks by importance may add additional benefit to your planner.  iii. Break down large projects into smaller tasks and write down the steps to completing the task.  Taking notes while reading can help with recall.  d. Storing Information: Use the below strategies to help you further enhance how information is stored.  i. Rehearse - Immediately after seeing/hearing something, try to recall it.  Wait a few  minutes, then check again.  Gradually lengthen the intervals between rehearsals.  ii. Repetition of learned material is critical to ensure storage of information to be learned. Self-test at home to ensure learning.  iii. Write down important information to improve your attention and focus and to have something to look back on when you need to recall it.  iv. Make sure the person doesnt rattle off, but presents in a clear, logical, and unhurried manner.   e. Recalling Information:  i. Jog your memory - Lose something?  Think back to when you last had it.  What did you do next?  And after that?  Mentally walk yourself through each activity that followed.  Prodding your memory this way may enable you to recall the location of the missing item.  ii. Use a cue - Symbolic reminders (the proverbial string around the finger) are helpful.  So too are memos, timers, calendar notes, etc.--keep them in visible, appropriate places.  iii. Get organized - Have fixed locations for all important papers, key phone numbers, medications, keys, wallet, glasses, tools, etc.  iv. Develop routines - Routines can anchor memories so they do not drift away.    f. Sleep Hygiene: Poor sleep has a negative effect on cognition. Several strategies have been shown to improve sleep:   i. Caffeine intake in the afternoon and evening, as well as stuffing oneself at supper, can decrease the quality of restful sleep throughout the night.   ii. Bedtime and wake-up times should be consistent every night and morning so the body becomes used to a single routine, even on the weekends.  iii. Engage in daily physical activity, but not 2-3 hours before bedtime.   iv. No technology use (television, computer, iPad) 1-2 hours before bed.   v. Have a wind down routine (e.g., soft lights in the house, bath before bed, reduced fluid intake, songs, reading, less noise) to promote sleep readiness.   vi. Visit the www.sleepfoundation.org for more strategies.    g. Practice good cognitive hygiene:  i. Engage in regular exercise, which increases alertness and arousal and can improve attention and focus.  Consider lower impact exercises, such as yoga or light walking.  ii. Get a good nights sleep, as this can enhance alertness and cognition.  iii. Eat healthy foods and balanced meals. It is notable that research indicates certain nutrients may aid in brain function, such as B vitamins (especially B6, B12, and folic acid), antioxidants (such as vitamins C and E, and beta carotene), and Omega-3 fatty acids. Talk with your physician or nutritionist about whats right for you.   iv. Keep your brain active. Find activities to stay mentally active, such as reading, games (cards, checkers), puzzles (crosswords, Sudoku, jig saw), crafts (models, woodworking), gardening, or participating in activities in the community.  v. Stay socially engaged. Continue staying active with your family and friends.      Please look into the following websites, to help you find resources including day programs, caregivers and caregiver support, legal questions, support groups, etc.    [facility]  ADDRESS  Mailing Address:  [address]  [place] [address] Street Address:  [address] [place] [address]   Web Address:  [email or web address]     Services offered at this location:  Chore, Congregate Meals, Home Delivered Meals, Homemaker, Information and Assistance, Legal, Material Aid, Medical Alert, Medication Management, NFCSP Information and Assistance, NFCSP In-Home Respite, NFCSP Material Aid, NGCSP Personal Care , NFCSP Public Education, NFCSP Sitter Service, NFCSP Support Groups, Nutrition Counseling, Nutrition Education, Outreach, Recreation, Transportation, Utility Assistance, Wellness, Area Agency on Aging, [place] on Aging      Peace With Dementia: http://carepartnerGood Deal.com/    Website for Area Agency on Ageing:  http://goea.[place].[facility]/index.cfm?md=tha&tmp=category&catID=38&nid=24&ssid=0&startIndex=1    Website for the Alzheimers Association:  http://www.alz.org/        Follow-up in about 3 months (around 5/6/2018).     Over 40 minutes time spent with patient, > 50% in discussion and counseling with patient regarding diagnosis, prognosis and treatment strategies    Thank you very much for the opportunity to assist in this patient's care.  If you have any questions or concerns, please do not hesitate to contact me at any time.     Sincerely,  Christopher Chávez, DO

## 2018-02-06 NOTE — ASSESSMENT & PLAN NOTE
We reviewed the results of his neuropsychological testing, diagnosis of amnestic mild cognitive impairment, and reviewed his MRI studies.  He does not have any significant degree of chronic vascular changes, but there is some diffuse atrophy primarily in the posterior regions.    From a diagnostic standpoint, he would like to know what to expect in the future and wanted to pursue further studies.  I think further imaging studies like PET scanning would be more academic and difficult to use practically.  We will set up lumbar puncture under fluoroscopic guidance, specifically checking for tau levels, phosphorylase did tell 181, and amyloid beta 1-42 in addition to ruling out infection like Lyme or HSV.    Discussed lifestyle modification, encouraged him to continue with alcohol abstinence, and encouraged exercise and physical activity, healthy diet, and good sleep habits.

## 2018-03-16 ENCOUNTER — TELEPHONE (OUTPATIENT)
Dept: NEUROSURGERY | Facility: CLINIC | Age: 68
End: 2018-03-16

## 2018-03-16 NOTE — TELEPHONE ENCOUNTER
Spoke with pt and scheduled f/u appt. Date, time, and location verified. Pt verbalized understanding.

## 2018-03-16 NOTE — TELEPHONE ENCOUNTER
----- Message from Lorna Andrews LPN sent at 11/7/2017 11:28 AM CST -----  Call pt to set up 1 year post op exam with KOFI Isidro for April.

## 2018-03-19 ENCOUNTER — TELEPHONE (OUTPATIENT)
Dept: NEUROLOGY | Facility: CLINIC | Age: 68
End: 2018-03-19

## 2018-03-19 ENCOUNTER — OFFICE VISIT (OUTPATIENT)
Dept: NEUROSURGERY | Facility: CLINIC | Age: 68
End: 2018-03-19
Payer: COMMERCIAL

## 2018-03-19 VITALS
DIASTOLIC BLOOD PRESSURE: 79 MMHG | BODY MASS INDEX: 33.57 KG/M2 | SYSTOLIC BLOOD PRESSURE: 145 MMHG | HEART RATE: 78 BPM | HEIGHT: 67 IN | WEIGHT: 213.88 LBS

## 2018-03-19 DIAGNOSIS — M54.12 CERVICAL RADICULOPATHY: ICD-10-CM

## 2018-03-19 DIAGNOSIS — G56.23 ULNAR NEUROPATHY OF BOTH UPPER EXTREMITIES: Primary | ICD-10-CM

## 2018-03-19 PROCEDURE — 99214 OFFICE O/P EST MOD 30 MIN: CPT | Mod: S$GLB,,, | Performed by: PHYSICIAN ASSISTANT

## 2018-03-19 PROCEDURE — 99999 PR PBB SHADOW E&M-EST. PATIENT-LVL IV: CPT | Mod: PBBFAC,,, | Performed by: PHYSICIAN ASSISTANT

## 2018-03-19 RX ORDER — TALC
POWDER (GRAM) TOPICAL
COMMUNITY

## 2018-03-19 NOTE — TELEPHONE ENCOUNTER
----- Message from Lucina Santiago sent at 3/19/2018  9:14 AM CDT -----  Contact: patient  Patient calling to reschedule his appt on 5/8/18. He would like to come in 5/3, 5/4 or anytime during the week of 5/14. No avail appt before or after 5/8. Please advise.  Call back   Thanks!

## 2018-03-19 NOTE — TELEPHONE ENCOUNTER
----- Message from Kirsty Huddleston sent at 3/19/2018  9:41 AM CDT -----  Contact: self  Patient needs to reschedule his appt to either May 3rd or 4th, the week of May 14th, or May 21st. Please call back [phone] (home)

## 2018-03-20 NOTE — PROGRESS NOTES
Neurosurgery History & Physical    Patient ID: Vikas Angelo is a 67 y.o. male.    Chief Complaint   Patient presents with    Follow-up     1 year follow up from cervical decompression surgery with Dr. Rojo. Pt reports mild pain in neck.       Review of Systems   Constitutional: Negative for chills, diaphoresis, fatigue and fever.   HENT: Negative for congestion, hearing loss, rhinorrhea and sore throat.    Eyes: Negative for photophobia, pain, redness and visual disturbance.   Respiratory: Negative for cough, chest tightness, shortness of breath and wheezing.    Cardiovascular: Negative for chest pain, palpitations and leg swelling.   Gastrointestinal: Negative for abdominal distention, abdominal pain, constipation, diarrhea, nausea and vomiting.   Genitourinary: Negative for difficulty urinating, dysuria, frequency, hematuria and urgency.   Musculoskeletal: Negative for back pain, gait problem, myalgias, neck pain and neck stiffness.   Skin: Negative for pallor and rash.   Neurological: Positive for weakness and numbness. Negative for dizziness, seizures, speech difficulty, light-headedness and headaches.   Psychiatric/Behavioral: Negative for confusion, hallucinations and sleep disturbance.       Past Medical History:   Diagnosis Date    Gout     Hyperlipidemia     Hypertension     Memory problem     Nerve pain     extremities     Social History     Social History    Marital status:      Spouse name: N/A    Number of children: N/A    Years of education: N/A     Occupational History    Not on file.     Social History Main Topics    Smoking status: Former Smoker     Types: Cigarettes, Pipe     Quit date: 2002    Smokeless tobacco: Never Used    Alcohol use Yes      Comment: 2 drinks daily    Drug use: No    Sexual activity: Not on file     Other Topics Concern    Not on file     Social History Narrative    No narrative on file     Family History   Problem Relation Age of Onset     "Hypertension Mother     Hypertension Father     Cancer Sister      Review of patient's allergies indicates:  No Known Allergies    Current Outpatient Prescriptions:     allopurinol (ZYLOPRIM) 300 MG tablet, Take 300 mg by mouth once daily., Disp: , Rfl:     amlodipine (NORVASC) 5 MG tablet, TAKE 1 TABLET TWICE DAILY, Disp: , Rfl:     aspirin (ECOTRIN) 81 MG EC tablet, Take 81 mg by mouth once daily., Disp: , Rfl:     donepezil (ARICEPT) 10 MG tablet, Take 10 mg by mouth., Disp: , Rfl:     ferrous sulfate 324 mg (65 mg iron) TbEC, Take 325 mg by mouth once daily., Disp: , Rfl:     fluticasone (FLONASE) 50 mcg/actuation nasal spray, 1 spray by Each Nare route as needed. , Disp: , Rfl:     folic acid (FOLVITE) 800 MCG Tab, Take 800 mcg by mouth once daily., Disp: , Rfl:     gabapentin (NEURONTIN) 300 MG capsule, TAKE 2 CAPSULES (600 MG TOTAL) BY MOUTH 2 (TWO) TIMES DAILY., Disp: , Rfl:     hydrocortisone (ANUSOL-HC) 2.5 % rectal cream, Place rectally 2 (two) times daily., Disp: , Rfl:     ibuprofen (ADVIL,MOTRIN) 800 MG tablet, TAKE 1 TABLET (800 MG TOTAL) BY MOUTH 2 (TWO) TIMES DAILY AS NEEDED FOR PAIN., Disp: , Rfl:     melatonin 3 mg Tab, Take by mouth as needed., Disp: , Rfl:     meloxicam (MOBIC) 15 MG tablet, Take 15 mg by mouth daily as needed for Pain., Disp: , Rfl:     memantine (NAMENDA) 10 MG Tab, Take 5 mg by mouth 2 (two) times daily., Disp: , Rfl:     MULTIVIT-IRON-MIN-FOLIC ACID 3,500-18-0.4 UNIT-MG-MG ORAL CHEW, Take 1 tablet by mouth every morning. , Disp: , Rfl:     niacin, inositol niacinate, 500 mg Tab, Take 1 tablet by mouth., Disp: , Rfl:     sildenafil (VIAGRA) 50 MG tablet, Take 50 mg by mouth as needed., Disp: , Rfl:     undecylenic acid (FUNGI-NAIL) 25 % Soln, Apply topically., Disp: , Rfl:     vitamin E 400 UNIT capsule, Take 400 Units by mouth once daily., Disp: , Rfl:   Blood pressure (!) 145/79, pulse 78, height 5' 7" (1.702 m), weight 97 kg (213 lb 13.5 " oz).      Neurologic Exam  Mental Status   Oriented to person, place, and time.   Attention: normal. Concentration: normal.   Speech: speech is normal   Level of consciousness: alert  Knowledge: good.      Cranial Nerves      CN II   Visual acuity: normal     CN III, IV, VI   Pupils are equal, round, and reactive to light.  Extraocular motions are normal.      CN V   Facial sensation intact.      CN VII   Facial expression full, symmetric.      CN VIII   Hearing: intact     CN IX, X   Palate: symmetric     CN XI   CN XI normal.      CN XII   CN XII normal.      Motor Exam   Muscle bulk: decreased (in left hand intrinsics and thenar eminence)  Overall muscle tone: normal     Strength   Right neck flexion: 5/5  Left neck flexion: 5/5  Right neck extension: 5/5  Left neck extension: 5/5  Right deltoid: 5/5  Left deltoid: 5/5  Right biceps: 5/5  Left biceps: 5/5  Right triceps: 5/5  Left triceps: 5/5  Right wrist flexion: 5/5  Left wrist flexion: 5/5  Right wrist extension: 5/5  Left wrist extension: 5/5  Right interossei: 5/5  Left interossei: 3/5  Right abdominals: 5/5  Left abdominals: 5/5  Right iliopsoas: 5/5  Left iliopsoas: 5/5  Right quadriceps: 5/5  Left quadriceps: 5/5  Right hamstrin/5  Left hamstrin/5  Right glutei: 5/5  Left glutei: 5/5  Right anterior tibial: 5/5  Left anterior tibial: 5/5  Right posterior tibial: 5/5  Left posterior tibial: 5/5  Right peroneal: 5/5  Left peroneal: 5/5  Right gastroc: 5/5  Left gastroc: 5/5     Sensory Exam   Sensory deficit distribution on left: C8     Gait, Coordination, and Reflexes      Gait  Gait: normal     Coordination   Finger to nose coordination: normal     Reflexes   Right plantar: normal  Left plantar: normal  Right Camacho: absent  Left Camacho: absent    Physical Exam  Constitutional: He is oriented to person, place, and time. He appears well-developed and well-nourished.   HENT:   Head: Normocephalic and atraumatic.   Eyes: EOM are normal. Pupils are  equal, round, and reactive to light.   Neck: Normal range of motion.   Cardiovascular: Normal rate.    Pulmonary/Chest: Effort normal.   Abdominal: Soft.   Musculoskeletal: Normal range of motion.   Neurological: He is alert and oriented to person, place, and time. He has a normal Finger-Nose-Finger Test. Gait normal.   Skin: Skin is warm and dry.   Psychiatric: He has a normal mood and affect. His speech is normal and behavior is normal. Judgment and thought content normal.     Oswestry Disability Index score: 40%     Patient Health Questionnaire score: 6    Provider dictation:    Mr. Angelo is now 1 year post op s/p posterior cervical spine left minimally invasive approach for C5-T1 laminoforaminotomies. While he has definite improvement in wrist flexion, extension and biceps strength, he continues to have significant weakness in hand  and intrinsics. He knows that his hand strength will likely never be normal because of the degree of muscle atrophy that he initially presented with. He was last seen in clinic by Dr. Rojo on 10/2/17 and a repeat EMG/NCS was ordered to specifically look for signs of recovery and to rule out new superimpopsed ulnar neuropathy. The EMG showed the below. Orders were then placed for patient to begin OT which he states he just completed 2 weeks ago. He has noticed significant improvement in his hand function and he continues to perform the at home exercises.      EMG/NCS:  Moderate left C6 chronic radiculopathy w. NO active denervation  Mild right C6 chronic radiculopathy w. NO active denervation  Mild to moderate bilateral carpal tunnel syndrome  Mild right ulnar neuropathy at elbow  Moderate left ulnar neuropathy at elbow    On exam Mr. Angelo continues with 3/5 left hand intrinsic weakness, but patient reports significant improvement.     Mr. Angelo appears to have improved in regard to hand function and reports only mild intermittent left medial forearm pain/,numbness  which could be related to the ulnar neuropathy. At this point would recommend continued conservative treatment and he will contact us with any worsening in hand function or pain/numbness. He can follow-up on a prn basis. Patient was informed to call the clinic with any further questions or concerns.    1. Ulnar neuropathy of both upper extremities     2. Cervical radiculopathy

## 2018-05-03 ENCOUNTER — OFFICE VISIT (OUTPATIENT)
Dept: NEUROLOGY | Facility: CLINIC | Age: 68
End: 2018-05-03
Payer: COMMERCIAL

## 2018-05-03 VITALS
BODY MASS INDEX: 34.11 KG/M2 | HEART RATE: 69 BPM | WEIGHT: 217.31 LBS | HEIGHT: 67 IN | RESPIRATION RATE: 18 BRPM | SYSTOLIC BLOOD PRESSURE: 130 MMHG | DIASTOLIC BLOOD PRESSURE: 60 MMHG

## 2018-05-03 DIAGNOSIS — G31.84 MILD COGNITIVE IMPAIRMENT: Primary | ICD-10-CM

## 2018-05-03 PROCEDURE — 99999 PR PBB SHADOW E&M-EST. PATIENT-LVL IV: CPT | Mod: PBBFAC,,, | Performed by: PSYCHIATRY & NEUROLOGY

## 2018-05-03 PROCEDURE — 99024 POSTOP FOLLOW-UP VISIT: CPT | Mod: S$GLB,,, | Performed by: PSYCHIATRY & NEUROLOGY

## 2018-05-03 NOTE — PROGRESS NOTES
Subjective:          Patient ID: Vikas Angelo is a 67 y.o.  male who presents for follow up of tremor and memory loss    Initial / Last History of Present Illness:    1/3/18:     Reviewed records in Epic.  Patient has a history of cervical spinal fusion, recently had EMG/nerve conduction study of the left arm which showed ulnar neuropathy and chronic moderate left C6 radiculopathy. Presented to [facility] in Oct 2016 with LUE weakness and pain.      Earlier in the year, neurosurgery obtain multiple studies and determined he may get some benefit from laminar foraminotomy at C6-7 and C7-T1, as he may have had some combination of cervical radiculopathy and peripheral nerve dysfunction in the upper extremities.  Had surgery in March 2017.      He is referred today for evaluation and management of early onset dementia and tremor.     Previously had seen Dr. Lai in [place], [place] and has been on memantine and donepezil.      Tremor - does not interfere particularly with daily activities, more of a nuisance.  Constant, equal in both hands.  Not aware of any exacerbating factors, does not drink caffeine.  Not aware of any family hx.  He is cutting back on alcohol - has quit a couple of times in the past.  Does not feel this has been problematic, but has been concerned about his intake previously.       He is here with his .  Feels he has always had good memory up until a few years ago, then started to fade. He is concerned about potential medications exacerbating his memory.  Also concerned about impact of long term drinking as contributing.  STM>LTM.     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   does most of the household accounting.   is a professional , has always done all the cooking.  No falls.       + repeating questions, can be as soon as 15 minutes later.  Forgets conversations, dates.  He is using a calendar to keep organized.  About a couple  of years ago      Does not exercise regularly.  Eats reasonably well, but not very often.  Sleeps often during the day, would tend to be awake at night, may be sleeping more than usual.       + peripheral neuropathy - at most 5-6 years, pins and needles, on GBP     Interval history 2/6/18:     Here today to review results of neuropsychological testing.  Reviewed results of his testing and note from January 10, 2018.  Although his intellectual abilities were estimated to be in the superior range with an IQ of 125, and his general cognitive abilities by the RBANS were in the average range, he did show severe impairments in delayed memory consistent with an amnestic mild cognitive impairment.     The MRI of the brain did show some areas of volume loss/atrophy primarily involving the parietal lobes, with only minimal chronic small vessel changes.     Here again with his  - he did stop drinking 5 weeks ago and has noted some fog lifting. We talked about his test results and MRI, reviewed images together and discussed treatment and diagnostic in detail.       Interval history 5/3/18:    Here to discuss CSF results for evaluation of cognitive impairment and possible dementia    White blood cell count was normal at 4, 80 red cells.  The protein was elevated significantly at 132.  Normal glucose of 51.  HSV antibodies were negative.    Unfortunately, there was an error at the lab and the orders for CSF testing of tau, P-tau and amyloid beta 1-42 were cancelled without discussion prior to sending the more routine local studies; called lab to confirm, stated not enough CSF available for testing as the reason.  I apologized for the error, and we talked about the significance of the current data in the scope of possible AD and his overall condition. He and his  were naturally very disappointed about the lab studies, but very understanding as well.      They will consider if they want to repeat an LP for the  biomarkers or not, and we talked about the relatively limited utility of these studies; they can be taken as a strong indication of AD, but are not absolute.       Review of patient's allergies indicates:  No Known Allergies  Current Outpatient Prescriptions   Medication Sig Dispense Refill    amlodipine (NORVASC) 5 MG tablet TAKE 1 TABLET TWICE DAILY      aspirin (ECOTRIN) 81 MG EC tablet Take 81 mg by mouth once daily.      donepezil (ARICEPT) 10 MG tablet Take 10 mg by mouth.      ferrous sulfate 324 mg (65 mg iron) TbEC Take 325 mg by mouth once daily.      fluticasone (FLONASE) 50 mcg/actuation nasal spray 1 spray by Each Nare route as needed.       folic acid (FOLVITE) 800 MCG Tab Take 800 mcg by mouth once daily.      gabapentin (NEURONTIN) 300 MG capsule TAKE 2 CAPSULES (600 MG TOTAL) BY MOUTH 2 (TWO) TIMES DAILY.      hydrocortisone (ANUSOL-HC) 2.5 % rectal cream Place rectally 2 (two) times daily.      ibuprofen (ADVIL,MOTRIN) 800 MG tablet TAKE 1 TABLET (800 MG TOTAL) BY MOUTH 2 (TWO) TIMES DAILY AS NEEDED FOR PAIN.      melatonin 3 mg Tab Take by mouth as needed.      meloxicam (MOBIC) 15 MG tablet Take 15 mg by mouth daily as needed for Pain.      memantine (NAMENDA) 10 MG Tab Take 5 mg by mouth 2 (two) times daily.      MULTIVIT-IRON-MIN-FOLIC ACID 3,500-18-0.4 UNIT-MG-MG ORAL CHEW Take 1 tablet by mouth every morning.       niacin, inositol niacinate, 500 mg Tab Take 1 tablet by mouth.      sildenafil (VIAGRA) 50 MG tablet Take 50 mg by mouth as needed.      undecylenic acid (FUNGI-NAIL) 25 % Soln Apply topically.      vitamin E 400 UNIT capsule Take 400 Units by mouth once daily.      allopurinol (ZYLOPRIM) 300 MG tablet Take 300 mg by mouth once daily.       No current facility-administered medications for this visit.          Review of Systems  Review of Systems    Objective:        Vitals:    05/03/18 1535   BP: 130/60   Pulse: 69   Resp: 18     Body mass index is 34.03  kg/m².  Neurologic Exam    Physical Exam      Data Review:    Results for CHING ROSALES (MRN [id number]) as of 5/3/2018 16:09   Ref. Range 2/19/2018 14:24 2/19/2018 14:25   HSV 1 and/or 2 IgG, CSF Latest Ref Range: <=0.89 IV <0.34    HSV 1 and/or 2 IgM, CSF Latest Ref Range: <=0.89 IV 0.20    Color, CSF Latest Ref Range: Colorless   Colorless   Heme Aliquot Latest Units: mL  1.0   Appearance, CSF Latest Ref Range: Clear   Clear   WBC, CSF Latest Ref Range: 0 - 5 /cu mm  4   RBC, CSF Latest Ref Range: 0 /cu mm  80 (A)   Segmented Neutrophils, CSF Latest Ref Range: 0 - 6 %  5   Lymphs, CSF Latest Ref Range: 40 - 80 %  58   Mono/Macrophage, CSF Latest Ref Range: 15 - 45 %  37   Glucose, CSF Latest Ref Range: 40 - 70 mg/dL 51    Protein, CSF Latest Ref Range: 12 - 60 mg/dL 132 (H)    Neuron Specific Enolase, CSF Latest Ref Range: 1.0 - 7.0 ug/L 5.1      Assessment:        1. Mild cognitive impairment           Plan:         Problem List Items Addressed This Visit        Neuro    Mild cognitive impairment - Primary    Current Assessment & Plan     Continue with current medications, lifestyle modifications     Will think about a possible repeat LP and CSF evaluation vs continued clinical monitoring and management.                Follow-up in about 3 months (around 8/3/2018).       Thank you very much for the opportunity to assist in this patient's care.  If you have any questions or concerns, please do not hesitate to contact me at any time.     Sincerely,  Christopher Chávez, DO

## 2018-05-10 NOTE — ASSESSMENT & PLAN NOTE
Continue with current medications, lifestyle modifications     Will think about a possible repeat LP and CSF evaluation vs continued clinical monitoring and management.

## 2018-06-29 ENCOUNTER — TELEPHONE (OUTPATIENT)
Dept: NEUROLOGY | Facility: CLINIC | Age: 68
End: 2018-06-29

## 2018-06-29 NOTE — TELEPHONE ENCOUNTER
The patient's  called wanting to know if the hospital was able to do the other lab from his spinal fluid.  Also inquiring to see if the lab fee for the test not done was not charged to the patient.

## 2018-06-29 NOTE — TELEPHONE ENCOUNTER
----- Message from Katherine Davison sent at 6/28/2018  3:54 PM CDT -----  Contact: Rhett Corea [phone]  Please call him regarding the lumbar puncture.  Thank you!

## 2018-08-27 ENCOUNTER — TELEPHONE (OUTPATIENT)
Dept: NEUROSURGERY | Facility: CLINIC | Age: 68
End: 2018-08-27

## 2018-08-27 NOTE — TELEPHONE ENCOUNTER
Called patient regarding requested letter for scooter. Informed him he would have to get letter from PCP. Patients  verbalized understanding.

## 2018-08-27 NOTE — TELEPHONE ENCOUNTER
Pt's  called stating the pt is trying to get insurance approval for a mobility scooter due to his low back and left leg issues. As of now the scooter is pending with pt's insurance company. Pt's  is requesting a letter written from you stating the need for the mobility scooter due to the decrease in his left hand strength. Please advise.

## 2018-08-27 NOTE — TELEPHONE ENCOUNTER
Please let patient know that we do not recommend a mobility scooter for left hand weakness. We have not followed him for any low back or left leg symptoms in the past so this would need to be worked up by his PCP and PCP can provide documentation for need of mobility scooter is they recommend this.

## 2019-05-10 ENCOUNTER — LAB VISIT (OUTPATIENT)
Dept: LAB | Facility: HOSPITAL | Age: 69
End: 2019-05-10
Attending: INTERNAL MEDICINE
Payer: COMMERCIAL

## 2019-05-10 ENCOUNTER — OFFICE VISIT (OUTPATIENT)
Dept: HEMATOLOGY/ONCOLOGY | Facility: CLINIC | Age: 69
End: 2019-05-10
Payer: COMMERCIAL

## 2019-05-10 VITALS
RESPIRATION RATE: 18 BRPM | HEART RATE: 81 BPM | TEMPERATURE: 98 F | SYSTOLIC BLOOD PRESSURE: 125 MMHG | BODY MASS INDEX: 38.79 KG/M2 | WEIGHT: 247.13 LBS | HEIGHT: 67 IN | DIASTOLIC BLOOD PRESSURE: 78 MMHG

## 2019-05-10 DIAGNOSIS — D69.6 THROMBOCYTOPENIA: ICD-10-CM

## 2019-05-10 DIAGNOSIS — D68.9 COAGULATION DISORDER: ICD-10-CM

## 2019-05-10 DIAGNOSIS — D69.6 THROMBOCYTOPENIA: Primary | ICD-10-CM

## 2019-05-10 LAB
ALBUMIN SERPL BCP-MCNC: 4.7 G/DL (ref 3.5–5.2)
ALP SERPL-CCNC: 129 U/L (ref 38–145)
ALT SERPL W/O P-5'-P-CCNC: 25 U/L (ref 10–44)
ANION GAP SERPL CALC-SCNC: 10 MMOL/L (ref 8–16)
APTT PPP: 34.9 SEC (ref 24.6–36.7)
AST SERPL-CCNC: 27 U/L (ref 17–59)
BASOPHILS # BLD AUTO: 0.11 K/UL (ref 0–0.2)
BASOPHILS NFR BLD: 1 % (ref 0–1.9)
BILIRUB SERPL-MCNC: 0.6 MG/DL (ref 0.2–1.3)
BUN SERPL-MCNC: 7 MG/DL (ref 9–21)
CALCIUM SERPL-MCNC: 9.7 MG/DL (ref 8.4–10.2)
CHLORIDE SERPL-SCNC: 103 MMOL/L (ref 95–110)
CO2 SERPL-SCNC: 26 MMOL/L (ref 22–31)
CREAT SERPL-MCNC: 0.79 MG/DL (ref 0.5–1.4)
DIFFERENTIAL METHOD: ABNORMAL
EOSINOPHIL # BLD AUTO: 0.2 K/UL (ref 0–0.5)
EOSINOPHIL NFR BLD: 2.2 % (ref 0–8)
ERYTHROCYTE [DISTWIDTH] IN BLOOD BY AUTOMATED COUNT: 14.1 % (ref 11.5–14.5)
EST. GFR  (AFRICAN AMERICAN): >60 ML/MIN/1.73 M^2
EST. GFR  (NON AFRICAN AMERICAN): >60 ML/MIN/1.73 M^2
FOLATE SERPL-MCNC: >20 NG/ML (ref 4–24)
GLUCOSE SERPL-MCNC: 107 MG/DL (ref 70–110)
HAV IGM SERPL QL IA: NEGATIVE
HBV CORE IGM SERPL QL IA: NEGATIVE
HBV SURFACE AG SERPL QL IA: NEGATIVE
HCT VFR BLD AUTO: 44.4 % (ref 40–54)
HCV AB SERPL QL IA: NEGATIVE
HETEROPH AB SERPL QL IA: NEGATIVE
HGB BLD-MCNC: 15.4 G/DL (ref 14–18)
IMM GRANULOCYTES # BLD AUTO: 0.07 K/UL (ref 0–0.04)
IMM GRANULOCYTES NFR BLD AUTO: 0.6 % (ref 0–0.5)
INR PPP: 1
LDH SERPL L TO P-CCNC: 456 U/L (ref 313–618)
LYMPHOCYTES # BLD AUTO: 4.2 K/UL (ref 1–4.8)
LYMPHOCYTES NFR BLD: 37.9 % (ref 18–48)
MCH RBC QN AUTO: 34.1 PG (ref 27–31)
MCHC RBC AUTO-ENTMCNC: 34.7 G/DL (ref 32–36)
MCV RBC AUTO: 98 FL (ref 82–98)
MONOCYTES # BLD AUTO: 0.9 K/UL (ref 0.3–1)
MONOCYTES NFR BLD: 8.6 % (ref 4–15)
NEUTROPHILS # BLD AUTO: 5.4 K/UL (ref 1.8–7.7)
NEUTROPHILS NFR BLD: 49.7 % (ref 38–73)
NRBC BLD-RTO: 0 /100 WBC
PATH REV BLD -IMP: NORMAL
PLATELET # BLD AUTO: 152 K/UL (ref 150–350)
PMV BLD AUTO: 9.6 FL (ref 9.2–12.9)
POTASSIUM SERPL-SCNC: 4.2 MMOL/L (ref 3.5–5.1)
PROT SERPL-MCNC: 7.6 G/DL (ref 6–8.4)
PROTHROMBIN TIME: 12.7 SEC (ref 11.8–14.7)
RBC # BLD AUTO: 4.51 M/UL (ref 4.6–6.2)
RHEUMATOID FACT SERPL-ACNC: <8.6 IU/ML (ref 0–15)
SODIUM SERPL-SCNC: 139 MMOL/L (ref 136–145)
T4 FREE SP9 P CHAL SERPL-SCNC: 1.23 NG/DL (ref 0.78–2.19)
TSH SERPL DL<=0.005 MIU/L-ACNC: 2.35 UIU/ML (ref 0.4–4)
VIT B12 SERPL-MCNC: 730 PG/ML (ref 210–950)
WBC # BLD AUTO: 10.94 K/UL (ref 3.9–12.7)

## 2019-05-10 PROCEDURE — 85025 COMPLETE CBC W/AUTO DIFF WBC: CPT

## 2019-05-10 PROCEDURE — 86431 RHEUMATOID FACTOR QUANT: CPT

## 2019-05-10 PROCEDURE — 80074 ACUTE HEPATITIS PANEL: CPT

## 2019-05-10 PROCEDURE — 86703 HIV-1/HIV-2 1 RESULT ANTBDY: CPT

## 2019-05-10 PROCEDURE — 85025 COMPLETE CBC W/AUTO DIFF WBC: CPT | Mod: PN

## 2019-05-10 PROCEDURE — 85610 PROTHROMBIN TIME: CPT | Mod: PN

## 2019-05-10 PROCEDURE — 82746 ASSAY OF FOLIC ACID SERUM: CPT | Mod: PN

## 2019-05-10 PROCEDURE — 82607 VITAMIN B-12: CPT | Mod: PN

## 2019-05-10 PROCEDURE — 99999 PR PBB SHADOW E&M-EST. PATIENT-LVL III: CPT | Mod: PBBFAC,,, | Performed by: INTERNAL MEDICINE

## 2019-05-10 PROCEDURE — 80074 ACUTE HEPATITIS PANEL: CPT | Mod: PN

## 2019-05-10 PROCEDURE — 36415 COLL VENOUS BLD VENIPUNCTURE: CPT | Mod: PN

## 2019-05-10 PROCEDURE — 83615 LACTATE (LD) (LDH) ENZYME: CPT

## 2019-05-10 PROCEDURE — 86431 RHEUMATOID FACTOR QUANT: CPT | Mod: PN

## 2019-05-10 PROCEDURE — 99204 OFFICE O/P NEW MOD 45 MIN: CPT | Mod: S$GLB,,, | Performed by: INTERNAL MEDICINE

## 2019-05-10 PROCEDURE — 82607 VITAMIN B-12: CPT

## 2019-05-10 PROCEDURE — 80053 COMPREHEN METABOLIC PANEL: CPT

## 2019-05-10 PROCEDURE — 83615 LACTATE (LD) (LDH) ENZYME: CPT | Mod: PN

## 2019-05-10 PROCEDURE — 85610 PROTHROMBIN TIME: CPT

## 2019-05-10 PROCEDURE — 86038 ANTINUCLEAR ANTIBODIES: CPT

## 2019-05-10 PROCEDURE — 99204 PR OFFICE/OUTPT VISIT, NEW, LEVL IV, 45-59 MIN: ICD-10-PCS | Mod: S$GLB,,, | Performed by: INTERNAL MEDICINE

## 2019-05-10 PROCEDURE — 84439 ASSAY OF FREE THYROXINE: CPT

## 2019-05-10 PROCEDURE — 84439 ASSAY OF FREE THYROXINE: CPT | Mod: PN

## 2019-05-10 PROCEDURE — 85730 THROMBOPLASTIN TIME PARTIAL: CPT

## 2019-05-10 PROCEDURE — 84443 ASSAY THYROID STIM HORMONE: CPT

## 2019-05-10 PROCEDURE — 86308 HETEROPHILE ANTIBODY SCREEN: CPT | Mod: PN

## 2019-05-10 PROCEDURE — 84443 ASSAY THYROID STIM HORMONE: CPT | Mod: PN

## 2019-05-10 PROCEDURE — 85730 THROMBOPLASTIN TIME PARTIAL: CPT | Mod: PN

## 2019-05-10 PROCEDURE — 82746 ASSAY OF FOLIC ACID SERUM: CPT

## 2019-05-10 PROCEDURE — 86308 HETEROPHILE ANTIBODY SCREEN: CPT

## 2019-05-10 PROCEDURE — 80053 COMPREHEN METABOLIC PANEL: CPT | Mod: PN

## 2019-05-10 PROCEDURE — 99999 PR PBB SHADOW E&M-EST. PATIENT-LVL III: ICD-10-PCS | Mod: PBBFAC,,, | Performed by: INTERNAL MEDICINE

## 2019-05-10 RX ORDER — TRIAMCINOLONE ACETONIDE 1 MG/G
CREAM TOPICAL
COMMUNITY
Start: 2019-02-14 | End: 2019-05-10

## 2019-05-10 RX ORDER — VIT C/E/ZN/COPPR/LUTEIN/ZEAXAN 250MG-90MG
1000 CAPSULE ORAL
COMMUNITY
End: 2019-05-10
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
## 2019-05-10 NOTE — LETTER
May 10, 2019        Fran He MD  [id number]  [address]  [facility] [address]             [facility] [address]  [facility] [address]  Phone: [phone]  Fax: [phone]   Patient: Vikas Angelo   MR Number: [id number]   YOB: 1950   Date of Visit: 5/10/2019       Dear Dr. He:    Thank you for referring Vikas Angelo to me for evaluation coagulation status and mild thrombocytopenia. Below are the relevant portions of my assessment and plan of care.  If you have questions, please do not hesitate to call me. I look forward to following Vikas along with you.    Sincerely,      MD STEVE Schulz MD Pervez Mussarat, MD

## 2019-05-13 ENCOUNTER — TELEPHONE (OUTPATIENT)
Dept: HEMATOLOGY/ONCOLOGY | Facility: CLINIC | Age: 69
End: 2019-05-13

## 2019-05-13 LAB
ANA SER QL IF: NORMAL
HIV 1+2 AB+HIV1 P24 AG SERPL QL IA: NEGATIVE
PATH REV BLD -IMP: NORMAL

## 2019-05-13 NOTE — TELEPHONE ENCOUNTER
----- Message from Ellen Low sent at 5/13/2019  9:13 AM CDT -----  Contact: Patient  Type: Needs Medical Advice    Who Called:  Patient  Best Call Back Number:   Additional Information: Patient is calling to reschedule his 5/24/19 appt to the following week.Please call back and advise.

## 2019-05-13 NOTE — TELEPHONE ENCOUNTER
Returned call to patient, Patient is scheduled for back surgery 5/16 and 5/21. Patient also was not aware of US on 5/10/19. Rescheduled US to [facility] on 5/15/19 and 5/24/19 follow up with  Dr. Waddell to 5/31/19.   Also discussed in detail US prep of NPO after midnight. Patient voiced understanding and appreciation

## 2019-05-15 ENCOUNTER — HOSPITAL ENCOUNTER (OUTPATIENT)
Dept: RADIOLOGY | Facility: HOSPITAL | Age: 69
Discharge: HOME | End: 2019-05-15
Attending: INTERNAL MEDICINE
Payer: COMMERCIAL

## 2019-05-15 DIAGNOSIS — D68.9 COAGULATION DISORDER: ICD-10-CM

## 2019-05-15 DIAGNOSIS — D69.6 THROMBOCYTOPENIA: ICD-10-CM

## 2019-05-15 PROCEDURE — 76700 US ABDOMEN COMPLETE: ICD-10-PCS | Mod: 26,,, | Performed by: RADIOLOGY

## 2019-05-15 PROCEDURE — 76700 US EXAM ABDOM COMPLETE: CPT | Mod: 26,,, | Performed by: RADIOLOGY

## 2019-05-15 PROCEDURE — 76700 US EXAM ABDOM COMPLETE: CPT | Mod: TC,PO

## 2019-05-31 ENCOUNTER — TELEPHONE (OUTPATIENT)
Dept: HEMATOLOGY/ONCOLOGY | Facility: CLINIC | Age: 69
End: 2019-05-31

## 2019-05-31 NOTE — TELEPHONE ENCOUNTER
Returned call to patient's SO, Siva, who stated patient will not be in for appt today at 3 pm as he is currently recovering from spine sx. Rescheduled to 6/6/19 at 1140 am

## 2019-05-31 NOTE — TELEPHONE ENCOUNTER
----- Message from Jose Mendoza sent at 5/31/2019 11:37 AM CDT -----  Contact: Terrell  Type: Needs Medical Advice    Who Called:  Terrell patient's spouse  Symptoms (please be specific):    How long has patient had these symptoms:    Pharmacy name and phone #:    Best Call Back Number: [phone]  Additional Information: requesting a call back regarding today's appointment

## 2019-06-06 ENCOUNTER — TELEPHONE (OUTPATIENT)
Dept: HEMATOLOGY/ONCOLOGY | Facility: CLINIC | Age: 69
End: 2019-06-06
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
## 2019-06-10 ENCOUNTER — OFFICE VISIT (OUTPATIENT)
Dept: HEMATOLOGY/ONCOLOGY | Facility: CLINIC | Age: 69
End: 2019-06-10
Payer: COMMERCIAL

## 2019-06-10 VITALS
TEMPERATURE: 98 F | HEIGHT: 67 IN | SYSTOLIC BLOOD PRESSURE: 143 MMHG | RESPIRATION RATE: 18 BRPM | DIASTOLIC BLOOD PRESSURE: 70 MMHG | HEART RATE: 82 BPM | BODY MASS INDEX: 36.72 KG/M2 | WEIGHT: 233.94 LBS

## 2019-06-10 DIAGNOSIS — D69.6 THROMBOCYTOPENIA: Primary | ICD-10-CM

## 2019-06-10 PROCEDURE — 99213 PR OFFICE/OUTPT VISIT, EST, LEVL III, 20-29 MIN: ICD-10-PCS | Mod: S$GLB,,, | Performed by: INTERNAL MEDICINE

## 2019-06-10 PROCEDURE — 99999 PR PBB SHADOW E&M-EST. PATIENT-LVL III: CPT | Mod: PBBFAC,,, | Performed by: INTERNAL MEDICINE

## 2019-06-10 PROCEDURE — 99999 PR PBB SHADOW E&M-EST. PATIENT-LVL III: ICD-10-PCS | Mod: PBBFAC,,, | Performed by: INTERNAL MEDICINE

## 2019-06-10 PROCEDURE — 99213 OFFICE O/P EST LOW 20 MIN: CPT | Mod: S$GLB,,, | Performed by: INTERNAL MEDICINE

## 2019-06-10 RX ORDER — METHOCARBAMOL 750 MG/1
750 TABLET, FILM COATED ORAL 3 TIMES DAILY
Refills: 2 | COMMUNITY
Start: 2019-05-23

## 2019-06-10 RX ORDER — POTASSIUM CHLORIDE 750 MG/1
TABLET, FILM COATED, EXTENDED RELEASE ORAL
Refills: 0 | COMMUNITY
Start: 2019-05-26

## 2019-06-10 RX ORDER — AMLODIPINE BESYLATE 10 MG/1
10 TABLET ORAL DAILY
Refills: 1 | COMMUNITY
Start: 2019-06-01

## 2019-06-10 RX ORDER — OXYCODONE AND ACETAMINOPHEN 10; 325 MG/1; MG/1
1 TABLET ORAL
COMMUNITY
Start: 2019-05-23

## 2019-06-10 RX ORDER — OXYCODONE HYDROCHLORIDE 5 MG/1
5 TABLET ORAL
COMMUNITY
Start: 2019-05-23

## 2019-06-10 RX ORDER — OXYCODONE HYDROCHLORIDE 5 MG/1
TABLET ORAL
Refills: 0 | COMMUNITY
Start: 2019-05-26

## 2019-06-10 RX ORDER — OXYCODONE AND ACETAMINOPHEN 10; 325 MG/1; MG/1
TABLET ORAL
Refills: 0 | COMMUNITY
Start: 2019-05-26

## 2019-06-10 RX ORDER — METHOCARBAMOL 750 MG/1
750 TABLET, FILM COATED ORAL
COMMUNITY
Start: 2019-05-23

## 2019-06-10 RX ORDER — LANOLIN ALCOHOL/MO/W.PET/CERES
CREAM (GRAM) TOPICAL
Refills: 0 | COMMUNITY
Start: 2019-05-26

## 2019-06-10 RX ORDER — TALC
3 POWDER (GRAM) TOPICAL
COMMUNITY

## 2019-06-10 NOTE — PROGRESS NOTES
History of present illness:  The patient is a 68-year-old white gentleman well known to me for mild thrombocytopenia who returns to review results of interval workup.  In the interim, he proceeded with spinal fusion surgery.  He is recovering well from this and actively participating in rehabilitation.  Patient had significant blood loss around the time of surgery was transfuse 2 units of packed cells.  No other new complaints for pertinent findings on a 10 point review of systems    Physical examination:  Well-developed, obese, white gentleman in no acute distress, who has a weight of 234 lb (decreased by 13 lb).  VITAL SIGNS: Documented in EMR and reviewed   HEENT: Normocephalic, atraumatic. Oral mucosa pink and moist. Lips without   lesions. Tongue midline. Oropharynx clear. Nonicteric sclerae.   NECK: Supple, no adenopathy.  HEART: Regular rate and rhythm without murmur, gallop or rub.   LUNGS: Clear to auscultation bilaterally.  ABDOMEN: Soft, nontender, nondistended with positive normoactive bowel sounds,   no hepatosplenomegaly.   EXTREMITIES: No cyanosis, clubbing or edema. Distal pulses are intact.     Laboratory:  White count 10.9, hemoglobin 15.4, hematocrit 44.4, platelets 152.  B12 730, folate greater than 20.  Prothrombin time 12.7, INR 1, partial thromboplastin time 34.9.  Sodium 139, potassium 4.2, chloride 103, CO2 26, BUN 7, creatinine 0.8, glucose 107, calcium 9.7, liver function test within normal limits, LDH is 456, GFR is greater than 60.  Rheumatoid factor negative.  Hepatitis panel negative.  YESIKA negative.  HIV antibody negative.  Monospot negative.  TSH 2.350, free T4 1.23.    Abdominal ultrasound:  No evidence splenomegaly or fatty infiltration of the liver.    Impression:  1.  Mild, intermittent, idiopathic thrombocytopenia-doubt this is of clinical significance.    Plan:  1.  No additional hematologic workup per intervention plan.  2.  Patient may return to clinic on an as-needed  basis.  3.  Continue ongoing postoperative care in regard to spinal fusion.    This note was created using voice recognition software and may contain grammatical errors.

## 2019-12-30 ENCOUNTER — TELEPHONE (OUTPATIENT)
Dept: NEUROLOGY | Facility: CLINIC | Age: 69
End: 2019-12-30

## 2019-12-30 NOTE — TELEPHONE ENCOUNTER
----- Message from Amisha Arevalo sent at 12/30/2019  9:52 AM CST -----  [facility]  Patient need to schedule follow up appointment no availability  please call back at [phone] (home)       Case number [id number]

## 2020-02-10 ENCOUNTER — TELEPHONE (OUTPATIENT)
Dept: NEUROLOGY | Facility: CLINIC | Age: 70
End: 2020-02-10

## 2020-02-10 NOTE — TELEPHONE ENCOUNTER
Returned call to pt; explained that we currently don't have any availability; pt  stated that the pt has appt with [facility] and will be requesting medical records.

## 2020-02-10 NOTE — TELEPHONE ENCOUNTER
----- Message from Alysa Levine sent at 2/10/2020 10:29 AM CST -----  Type: Needs Medical Advice    Who Called:  Terrell Corea - spouse  Best Call Back Number: [phone]  Additional Information: contact to advise if a referral was received from Dr. Diaz, to evaluated for dementia.

## 2022-08-03 NOTE — PATIENT INSTRUCTIONS
I would like you to start taking thiamine 100 mg once a day, and a Vitamin D3 supplement 1000 units a day. Do whatever is necessary to stop drinking and stay away from any alcohol.  This can contribute to memory loss progression as well as neuropathy.    To get back into a regular sleep routine, try taking melatonin 3 mg one hour before going to sleep each evening, and stick to a regular routine.     a. Attention: Remember that inattention and lack of focus are major culprits to forgetting information so be sure and practice paying attention for adequate learning of information. If you rely on passive attention to remembering something (e.g., yeah, uh-huh approach), youll find you cannot recall it later. I recommend the following to improve attention, which may aid in later recall:   i. Reduce distractions in the area as much as possible.  ii. Look at the person as they are speaking to you.   iii. Paraphrase as they are speaking  iv. Write down important pieces of information   v. Ask them to repeat if you zone out.   vi. Have them simplify and reduce information that you need to attend to during conversation.   vii. Have visual cues to remind you if you need to do something later.  b. Processing Speed:   i. Using multiple modalities (e.g., listening, writing notes, asking questions, recording) to learn new information is likely to allow additional time for processing, thus improving memory for the material.   ii. Allowing sufficient time to complete tasks will reduce frustration and help to ensure completion.  c. Executive Functioning:  i. Dont attempt to multi-task.  Separate tasks so that each can be completed one at a time.  ii. Consider using a calendar/day planner, as that may be effective to help you plan and stay on track.  Color-coding specific tasks by importance may add additional benefit to your planner.  iii. Break down large projects into smaller tasks and write down the steps to completing the  task.  Taking notes while reading can help with recall.  d. Storing Information: Use the below strategies to help you further enhance how information is stored.  i. Rehearse - Immediately after seeing/hearing something, try to recall it.  Wait a few minutes, then check again.  Gradually lengthen the intervals between rehearsals.  ii. Repetition of learned material is critical to ensure storage of information to be learned. Self-test at home to ensure learning.  iii. Write down important information to improve your attention and focus and to have something to look back on when you need to recall it.  iv. Make sure the person doesnt rattle off, but presents in a clear, logical, and unhurried manner.   e. Recalling Information:  i. Jog your memory - Lose something?  Think back to when you last had it.  What did you do next?  And after that?  Mentally walk yourself through each activity that followed.  Prodding your memory this way may enable you to recall the location of the missing item.  ii. Use a cue - Symbolic reminders (the proverbial string around the finger) are helpful.  So too are memos, timers, calendar notes, etc.--keep them in visible, appropriate places.  iii. Get organized - Have fixed locations for all important papers, key phone numbers, medications, keys, wallet, glasses, tools, etc.  iv. Develop routines - Routines can anchor memories so they do not drift away.    f. Sleep Hygiene: Poor sleep has a negative effect on cognition. Several strategies have been shown to improve sleep:   i. Caffeine intake in the afternoon and evening, as well as stuffing oneself at supper, can decrease the quality of restful sleep throughout the night.   ii. Bedtime and wake-up times should be consistent every night and morning so the body becomes used to a single routine, even on the weekends.  iii. Engage in daily physical activity, but not 2-3 hours before bedtime.   iv. No technology use (television, computer, iPad)  1-2 hours before bed.   v. Have a wind down routine (e.g., soft lights in the house, bath before bed, reduced fluid intake, songs, reading, less noise) to promote sleep readiness.   vi. Visit the www.sleepfoundation.org for more strategies.   g. Practice good cognitive hygiene:  i. Engage in regular exercise, which increases alertness and arousal and can improve attention and focus.  Consider lower impact exercises, such as yoga or light walking.  ii. Get a good nights sleep, as this can enhance alertness and cognition.  iii. Eat healthy foods and balanced meals. It is notable that research indicates certain nutrients may aid in brain function, such as B vitamins (especially B6, B12, and folic acid), antioxidants (such as vitamins C and E, and beta carotene), and Omega-3 fatty acids. Talk with your physician or nutritionist about whats right for you.   iv. Keep your brain active. Find activities to stay mentally active, such as reading, games (cards, checkers), puzzles (crosswords, Sudoku, jikelsey saw), crafts (Hyper9, woodworking), gardening, or participating in activities in the community.  v. Stay socially engaged. Continue staying active with your family and friends.     Epidermal Sutures: 6-0 Ethilon

## 2022-08-03 NOTE — PROGRESS NOTES
CHIEF COMPLAINT:  Pain Left hand.    HPI:  Vikas Angelo is a 66 y.o. male who presents for neurosurgical evaluation. He  Developed subacute progressive left hand weakness with preceding symptoms. Work-up thus far did not show any brachial plexus pathology on MRI, EMG and CT chest. EMG demonstrated left C8 and T1 radiculopathies but he has minimal neck and left arm pain. His symptoms have been going on since November and seems to have stabilized. . The patient complains of  numbness, but no pain, rated as a 0 on a pain scale of 1-10. He reports there is weakness in the left hand. Patient denies accidents or trauma, denies bowel or bladder symptoms, denies gait change, denies clumsiness, reports incoordination, and reports changes in fine motor skills in the hands/fingers.      (Not in a hospital admission)    Review of patient's allergies indicates:  No Known Allergies    Past Medical History   Diagnosis Date    Gout     Hyperlipidemia     Hypertension      Past Surgical History   Procedure Laterality Date    Lumbar fusion       Family History   Problem Relation Age of Onset    Hypertension Mother     Hypertension Father     Cancer Sister      Social History   Substance Use Topics    Smoking status: Former Smoker    Smokeless tobacco: None    Alcohol use None        Review of Systems:  Constitutional: no fever or chills, pain well controlled  Eyes: no visual changes  ENT: no nasal congestion or sore throat  Respiratory: no cough or shortness of breath  Cardiovascular: no chest pain or palpitations  Gastrointestinal: no nausea or vomiting, tolerating diet  Genitourinary: no hematuria or dysuria  Integument/Breast: no rash or pruritis  Hematologic/Lymphatic: no easy bruising or lymphadenopathy  Musculoskeletal: no arthralgias or myalgias, positive for muscle weakness  Neurological: no seizures or tremors, positive for weakness  Behavioral/Psych: no auditory or visual hallucinations  Endocrine: no heat or  From: Nicolle Ritter  To: Karon Chery  Sent: 8/3/2022 11:45 AM CDT  Subject: Mammogram    I just got off the phone with insurance and they said that a routine mammogram ordered with MD recommendation will be 100% covered by insurance. Breast MRIs are not covered by insurance. I would like to continue with getting a Mammogram, Please.   cold intolerance    Review of Systems    OBJECTIVE:     Vital Signs (Most Recent)       Physical Exam:    Physical Exam:  Nursing note and vitals reviewed.    Constitutional: He appears well-developed and well-nourished.     Eyes: Pupils are equal, round, and reactive to light. Conjunctivae and EOM are normal.     Cardiovascular: Normal rate.     Abdominal: Soft. Bowel sounds are normal.     Psych/Behavior: He is alert. He is oriented to person, place, and time. He has a normal mood and affect.     Musculoskeletal: Gait is normal.        Neck: Range of motion is full. Muscle strength is 5/5. Tone is normal.        Back: Range of motion is full. Muscle strength is 5/5. Tone is normal.        Right Upper Extremities: Range of motion is full. Muscle strength is 5/5. Tone is normal.        Left Upper Extremities: Range of motion is limited. ROM severity is left ADM 3/5, APB 3/5, first dorsal interrossei 3/5, first 2 lumbricals 4/5, OP 3/5, ODM 3/5. Muscle strength is 3/5. Tone is normal.       Right Lower Extremities: Range of motion is full. Muscle strength is 5/5. Tone is normal.        Left Lower Extremities: Range of motion is full. Muscle strength is 5/5. Tone is normal.     Neurological:        Coordination: He has a normal Romberg Test, normal finger to nose coordination, normal heel to shin coordination and normal tandem walking coordination.        Sensory: There is sensory deficit in the extremities. Sensory deficit is located reduced left C7.        DTRs: DTRs are DTRS NORMAL AND SYMMETRICnormal and symmetric.        Cranial nerves: Cranial nerve(s) II, III, IV, V, VI, VII, VIII, IX, X, XI and XII are intact.   No tongue fasciculations.    Laboratory  none    Diagnostic Results:  MRI: Reviewed  C-spine: Multilevel DDD with moderate foraminal stenoses left C6-7 and C7-T1, mostly far lateral and perhaps with a lateral disc at C6-7.    ASSESSMENT/PLAN:     Impression- symptomatic left C6-7 and C7-T1 foraminal  stenoses consistent with EMG findings of left C8 and T1 radiculopathies    Plan- I will recommend left C6-7 and C7-T1 laminoforaminotomies. Patient will follow up with Dr Rojo for surgical treatment.